# Patient Record
Sex: FEMALE | Race: WHITE | NOT HISPANIC OR LATINO | Employment: OTHER | URBAN - METROPOLITAN AREA
[De-identification: names, ages, dates, MRNs, and addresses within clinical notes are randomized per-mention and may not be internally consistent; named-entity substitution may affect disease eponyms.]

---

## 2017-03-14 ENCOUNTER — ALLIED HEALTH/NURSE VISIT (OUTPATIENT)
Dept: FAMILY MEDICINE | Facility: CLINIC | Age: 72
End: 2017-03-14
Payer: COMMERCIAL

## 2017-03-14 VITALS
SYSTOLIC BLOOD PRESSURE: 121 MMHG | HEIGHT: 64 IN | HEART RATE: 77 BPM | DIASTOLIC BLOOD PRESSURE: 71 MMHG | WEIGHT: 146 LBS | BODY MASS INDEX: 24.92 KG/M2

## 2017-03-14 DIAGNOSIS — Z71.3 ENCOUNTER FOR DIETARY COUNSELING AND SURVEILLANCE: Primary | ICD-10-CM

## 2017-03-14 PROCEDURE — 99207 ZZC NO BILLABLE SERVICE THIS VISIT: CPT | Performed by: FAMILY MEDICINE

## 2017-03-14 RX ORDER — UBIDECARENONE 50 MG
CAPSULE ORAL
COMMUNITY
End: 2021-06-24

## 2017-03-14 ASSESSMENT — ANXIETY QUESTIONNAIRES
GAD7 TOTAL SCORE: 0
4. TROUBLE RELAXING: NOT AT ALL
7. FEELING AFRAID AS IF SOMETHING AWFUL MIGHT HAPPEN: NOT AT ALL
6. BECOMING EASILY ANNOYED OR IRRITABLE: NOT AT ALL
1. FEELING NERVOUS, ANXIOUS, OR ON EDGE: NOT AT ALL
5. BEING SO RESTLESS THAT IT IS HARD TO SIT STILL: NOT AT ALL
3. WORRYING TOO MUCH ABOUT DIFFERENT THINGS: NOT AT ALL
2. NOT BEING ABLE TO STOP OR CONTROL WORRYING: NOT AT ALL

## 2017-03-14 NOTE — PROGRESS NOTES
"Well Index - Whole Health Assessment       Personal Information    Full Name:  Romana Felipe  YOB: 1945  Assessment Date: 3/14/2017  Assessment Time:  2pm  Primary Care Provider:  Dr Anais Lind  Primary Care  Clinic:  [facility] Phone Number:  [phone]  Health Coaching    During the 12-week program, the [facility]  will be making periodic check-in calls to see how you are doing. To ensure regular and consistent communication, and respect for your personal or work schedule, please answer the following questions.      What is the best phone number to reach you? Please include area code:[phone]    What are the best times of day to reach you? 4pm      Personal Goals    What are your goals for the [facility] program?  (Please check all that apply.) Lose weight  Improve my chronic conditions(s)  Other:  scoliosis, left lumbar strengthen core,    Wants to lose 20# - has gained weight in last few years from 113# to 146#    What changes are you willing to make to reach your goals? (Please check all that apply.)      Eat healthier foods  Improve/change my diet  Exercise regularly   Learn and apply techniques to improve my chronic condition(s)       Why do you want to make these changes? \" to eliminate or lessen lower back pain\"     Healthy Days Survey  Centers for Disease Control & Prevention - Used with Permission    Would you say that your general health is: Very Good    Thinking about your physical health, which includes physical illness and injury,  for how many days during the past 30 days was your physical health   not good?  0     Thinking about your mental health, which includes stress, depression and problems with emotions, for how many days during the past 30 days was your mental health not good? 0    During the past 30 days, for about how many days did poor physical or mental   health keep you from doing your usual activities " "such as self-care, work, or recreation?   0      Managing My Health  [facility]  Patient Activation Measure.  Copyright 7036-3450. [facility] - Used with Permission    Rate your knowledge, skill, and confidence in self-managing your health or chronic disease. Please select one answer for each of the following questions.     When all is said and done, I am the person who is responsible for taking care of my health:  Strongly agree    Taking an active role in my own health care is the most important thing that affects my health:   Strongly agree    I am confident I can help prevent or reduce problems associated with my health: Agree    I know what each of my prescribed medicines do: Strongly agree    I am confident that I can tell whether I need to go to the doctor or whether I can take care of a health problem myself:  Strongly agree    I am confident that I can tell a doctor the concerns I have even when he or she does not ask  Strongly agree       I am confident that I can follow through on medical treatments I may need to do at home:  Strongly agree    I understand my health problems and what causes them:  Strongly agree    I know what treatments are available for my health programs:  Agree    I have been able to maintain (keep up with) lifestyle changes, like eating right or exercising:  Strongly agree    I know how to prevent problems with my health:  Strongly agree     I am confident I can figure out solutions when new problems arise with my health:  Strongly agree    I am confident that I can maintain lifestyle changes, like eating right and exercising, even during times of stress:  Strongly agree    Stress       How would you rate the level of stress in your life?  1           Low                                                  High          What are the causes of your stress? (Please check all that apply.)   My health  My weight   Other (please specify):  \"my 's health in the last " "year\" - AAA repair     How do you cope with stress? (Please check all that apply.)   Getting emotional (e.g. irritability, anger, sadness, crying etc.)      Eating too much    Anxiety   SHANICE-7 - [employer] 2002 - Used with Permission    Over the last 2 WEEKS, how long have you been bothered by the following problems:     Feeling nervous, anxious, or on edge:   Not at all                   Not being able to stop or control worrying:  Not at all                       Worrying too much about different things:  Not at all                     Trouble relaxing:  Not at all                   Being so restless that it s hard to sit still:  Not at all                   Becoming easily annoyed or irritable:   Not at all                   Feeling afraid as if something awful might happen:   Not at all                    Patient Activation Measure (CHRISTI) 3/14/2017   Q1: When all is said and done, I am the person who is responsible for taking care of my health. 4   Q2: Taking an active role in my own health care is the most important thing that affects my health 4   Q3: I know what each of my prescribed medications do 4   Q4: I am confident that I can tell whether I need to go to the doctor or whether I can take care of a health problem myself. 4   Q5: I am confident that I can tell a doctor concerns I have even when he or she does not ask. 4   Q6: I am confident that I can follow through on medical treatments I may need to do at home  4   Q7: I have been able to maintain (keep up with) lifestyle changes, like eating right or exercising 4   Q8: I know how to prevent problems with my health 4   Q9: I am confident I can figure out solutions when new problems arise with my health. 4   Q10: I am confident that I can maintain lifestyle changes, like eating right and exercising, even during times of stress. 4         Emotional Well-Being  PHQ9 -   1999 [employer] All rights reserved - Used with Permission    Over the last 2 WEEKS, " how often have you been bothered by the following problems:     Little interest or pleasure in doing things:   Not at all                    Feeling down, depressed, or hopeless:   Not at all                    Trouble falling or staying asleep, or sleeping too much:   Not at all                   Feeling tired or having no energy:   Not at all                    Poor appetite or overeating:   Not at all                   Feeling bad about yourself or that you are a failure or have let yourself or your family down:   Not at all                    Trouble concentrating on things such as reading the newspaper or watching television:   Not at all                    Moving or speaking so slowly that people have noticed. Or, the opposite - being so fidgety or restless that you have been moving around a lot more than usual:   Not at all                   Thoughts that you would be better off dead or of hurting yourself in some way:   Not at all                   Do you have any current mental health diagnoses?  No   If yes, please specify:    Do you have a mental health therapist? No  If yes, who is your therapist?:    May we call them if we feel it would be beneficial in helping you reach your goals? No   If yes, what is their phone number?     Physical Activity    What kinds of physical activity do you enjoy? (Please check all that apply.)    Walking/hiking  Aerobics  Swimming  Biking/rollerblading  Yoga  Other:  vortex walking, deep water, jessi      Do you use a device to track your activity such as a Fitbit, Jawbone, pedometer   etc.? Yes    Do you feel comfortable lifting weights? Yes     Do you feel comfortable joining an exercise class? Yes    During an average day, how much time do you spend sitting?  Minimal      During an average week, on how many days do you get at least 30 minutes of moderate/vigorous physical activity?  3 days, for 1.25 hours      During an average day, how many non-work hours do you spend  watching screens (television, internet, video games, cell phone, etc.)? 4    What keeps you from being more active? (Please check all that apply.)   Other (please specify):  lower back pain, tired   Weight    How concerned are you about your weight?   Somewhat concerned    I have tried the following methods to lose weight: (Please check all that apply.)     Watching portion sizes or counting calories   Increasing my physical activity    If you are  or in a relationship, is your partner overweight?   Yes    Do you have children? Yes    If yes, are they overweight?  No    Nutrition & Eating Habits    Do you feel you have a good understanding of what makes up a healthy   diet?  Yes    If yes to the previous question, how often do you follow a healthy diet?  daily    How many servings of vegetables do you eat in a typical day? Do not include French fries.   A serving is one cup of salad greens or one-half cup of vegetables:  4    How many servings of fruit do you eat in a typical day?   A serving is one medium sized fruit or one-half cup of chopped, cut or canned fruit: 2    How many eight ounce glasses of water do you drink in a typical day? 5 - this includes 3 glasses of milk    How many eight ounce glasses of sugar sweetened beverages do you drink in a typical day?   (e.g. 100% juice or juice drinks, soda/pop, sports drinks, coffee drinks such as flavored lattes or frappuccinos, etc.)  0    How many cans or bottles of diet pop/soda/tea do you drink in a day? 0    My meals include foods like white bread, regular pasta, white rice, potatoes:  weekly    My meals include foods like fried meats, brats, burgers, French fries, mac and cheese, pizza, cheese, chips, or ice cream:  weekly        My meals include fast food (Mc[employer]onalds, [employer], [employer]ery, Taco Bell, etc.):  weekly     I eat at a buffet or sit-down restaurant:  weekly     I eat my meals in front of the TV or computer:  Daily - they watch the news during  dinner     I eat when I am depressed, stressed, anxious, or bored:   Never     I eat when I am happy or as a reward:  Never        I finish all the food on my plate even if I am already full:   weekly    Do you crave certain foods?  Yes  If yes, what foods do you crave? Please be specific.  sweets    In the past 12 months, how often did you worry that your food would run out before you had money to buy more?    Never     Do you worry about not having enough food to eat?   Never    Have you been to the food shelf at least a few times this past year?     No If yes, how many times?   Have you ever been diagnosed with an eating disorder? No     If yes, when were you diagnosed and what was the diagnosis?    Alcohol, Tobacco and Other Drugs    Social History   Substance Use Topics     Smoking status: Former Smoker     Smokeless tobacco: Not on file      Comment: in college      Alcohol use Yes      Comment: holidays or special occasions         Do you use any other drugs?    No  If yes, what drugs do you use? Please specify:     Sleep    How many hours do you sleep at night? 8    Do you think that you snore loudly or has anybody told you that you snore?  No  Has anyone seen or heard you stop breathing during your sleep?  No    Do you often feel tired, fatigued or sleepy during the day?    sometimes    Do you take medication to sleep?  No  If yes, what medication do you take? Please specify:      Medications & Allergies  No Known Allergies      Current Outpatient Prescriptions:      Rosuvastatin Calcium (CRESTOR PO), Take 20 mg by mouth, Disp: , Rfl:      Multiple Vitamins-Minerals (MULTIVITAMIN PO), , Disp: , Rfl:      Omega-3 Fatty Acids (FISH OIL PO), Take 1,000 mg by mouth, Disp: , Rfl:      Red Yeast Rice 600 MG TABS, , Disp: , Rfl:      UNABLE TO FIND, MEDICATION NAME: Cholest off - when eating a meal with cholesterol, Disp: , Rfl:      UNABLE TO FIND, MEDICATION NAME: Amoxicillin  Before dental, Disp: , Rfl:       aspirin 81 MG tablet, Take by mouth daily, Disp: , Rfl:      NIACIN PO, , Disp: , Rfl:      VITAMIN D, CHOLECALCIFEROL, PO, Take 1,000 Units by mouth daily, Disp: , Rfl:      Coenzyme Q10 (COQ10 PO), , Disp: , Rfl:      Are you taking any herbals, supplements, over the counter meds?  Yes If yes, please specify:  See med list   Do you take medication, prescription or over the counter, for pain?   Yes If yes, what medication do you take?  Occasional ibuprofen   Do you use any alternative forms of medicine (e.g. essential oils, homeopathy, acupuncture, naturopathy, etc.)?  No  If yes, please specify:      Do you feel safe in your home and relationships?   No    What are your main sources of social support? (Check all that apply.)  Spouse/partner  Friends  Family members    Are you connected to your community?  Yes  If yes, in what ways?  [facility], [facility]      Is there anything else you would like us to know so we can better partner with you in your Well Together journey?  No    If yes, please explain.    She is mostly doing the program to lose 20#, get stronger, and decrease back pain        Communication    It is important for you to have a way to contact the [facility] doctors and the health  in between your scheduled appointments should you have a question. We offer you the opportunity to sign up for My Chart, a secure online way for you to access your records and send a message to [facility] staff. [facility] doctors will typically respond to My Chart questions within 3-5 days. There is no cost to participate, and you can register at [email or web address] at any time.  Please let us know if this is of interest to you.    I plan to sign up for My Chart:   No     We are also exploring the possibility of creating a Well Together Facebook group page. We would use the page to share wellness and fitness tips, post great recipes, answer general questions, cheer you on, and more!  "You d be surrounded with people on a similar journey - your very own virtual community.  I am interested in joining a Well Together Facebook Group: unsure     Output and Qualitative Metrics per Massimo:     Participation in Core Program Elements (capture in spreadsheet or EPIC?)  Twelve Week Program Completion (capture in spreadsheet or EPIC?)  Participation in  Program Elements (tracked via sign in sheets)   New Patients  Program Satisfaction Survey  QoL Survey (CHRISTI)       Quantitative - Primary Metrics - Per Massimo  BMI  Weight  PHQ9  GAD7  Lipids  Waist/Hip Ratio  Body Fat %  Strength - push ups, sit ups, squats  Cardio - one mile walk test        PHYSICAL EXAM:  Vitals: /71 (BP Location: Left arm, Patient Position: Chair, Cuff Size: Adult Regular)  Pulse 77  Ht 5' 3.5\" (1.613 m)  Wt 146 lb (66.2 kg)  BMI 25.46 kg/m2  BMI= Body mass index is 25.46 kg/(m^2).           [facility] metrics:   sit ups -  # max reps         WILL BE IN NEW FLOWSHEET  push ups - # max reps  squats - # max reps  one minute walk test - distance  resting metabolic rate ( Body GEM) - in calories         ASSESSMENT AND PLAN OF CARE     Romana Felipe is a 71 year old female who comes to our [facility] collaborative for a whole health assessment.  Using motivational interviewing approaches and a collaborative model of care,she  agrees to the following shared lifestyle goals and plan of care.         Lifestyle Med Diagnoses:   Physical inactivity (getting less than recommended weekly amounts of exercise   Sedentary behaviors (getting less than 0.5-1.0 METS/day - sitting most of the day)  back pain      Mental health diagnoses:   none      Chronic conditions :   hypercholesterolemia  Arthritis  Other:  left NORMA, right TKA, bladder lift, hysterectomy, BCC on shoulder and back      Patient goals:   Lose weight  - eating a little less at each meal. Don't finish all the food on your plate. Cut down on sweets   Improve " "chronic conditions   Improve physical strength and endurance - work with  on core strength     [facility] - talk with the  about what exercises would strengthen the lower back and the abdominal wall muscles  Increase frequency of Y visits, perhaps decrease length of time or make sure time spent is varied    Screen time - how can you use it differently - resistance bands or arm weights     Join [employer] group    Meals - eat off salad plate. Read mindfulness handout     Look up a recipe for tabbolluviah - you will love it!       Core program elements:    [facility] fitness wellness consult .  Body Harrison Assessment to determine resting metabolic rate .  Twelve Week Cardio Program with [facility] personal trainers .  [facility] Dietician appointment .  Fasting Lipid panel at beginning and end of program .  Saturday morning Healthy Eating/Healthy Living mindfulness session .  Saturday morning Health Eating/Healthy Living nutrition and cooking session .  Check-ins over the phone or mychart with BHUMI Swenson regarding challenges and progress .  Check-in with MD midway through the program regarding challenges and progress .  Final check in with MD at end of 12 weeks .       program elements:    Weekly \"Walk with a Doc\" on Tuesdays  [facility] with [facility] programs including group exercise classes, cooking classes, etc  Tuesday evening \"Doc Talks\" with [facility] on a variety of medical subjects, other     In general:     Food  - consider buying Williams Hamilton s  Food Rules  and the cookbook  The Full Plate Diet   Real food, not too much. Mostly plants   Celebratory food is not everyday food  Pay attention to fullness   Eat until hunger has been relieved, not until you are full  Delicious food, delicious environment  - eat with friends and pay attention to the food   Healthy eating is a habit, not a diet   Meat is a side dish   All calories are not equal  Do not eat late at " mac Delacruz MD

## 2017-03-14 NOTE — MR AVS SNAPSHOT
"              After Visit Summary   3/14/2017    Romana Felipe    MRN: [id number]           Patient Information     Date Of Birth          1945        Visit Information        Provider Department      3/14/2017 8:23 AM Nancy Delacruz MD [facility]        Today's Diagnoses     Encounter for dietary counseling and surveillance    -  1       Follow-ups after your visit        Who to contact     Normal or non-critical lab and imaging results will be communicated to you by Booktrackhart, letter or phone within 4 business days after the clinic has received the results. If you do not hear from us within 7 days, please contact the clinic through Booktrackhart or phone. If you have a critical or abnormal lab result, we will notify you by phone as soon as possible.  Submit refill requests through Learn It Live or call your pharmacy and they will forward the refill request to us. Please allow 3 business days for your refill to be completed.          If you need to speak with a  for additional information , please call: [phone]             Additional Information About Your Visit        BooktrackharEvcarco Information     Learn It Live gives you secure access to your electronic health record. If you see a primary care provider, you can also send messages to your care team and make appointments. If you have questions, please call your primary care clinic.  If you do not have a primary care provider, please call [phone] and they will assist you.        Care EveryWhere ID     This is your Care EveryWhere ID. This could be used by other organizations to access your [place] medical records  [id number]        Your Vitals Were     Pulse Height BMI (Body Mass Index)             77 5' 3.5\" (1.613 m) 25.46 kg/m2          Blood Pressure from Last 3 Encounters:   04/25/17 137/77   03/14/17 121/71    Weight from Last 3 Encounters:   04/25/17 144 lb (65.3 kg)   03/14/17 146 lb (66.2 kg)              Today, you had the " following     No orders found for display       Primary Care Provider Office Phone # Fax #    Mely Angela Perry [phone]       [facility] [address] [address]        Thank you!     Thank you for choosing [facility]  for your care. Our goal is always to provide you with excellent care. Hearing back from our patients is one way we can continue to improve our services. Please take a few minutes to complete the written survey that you may receive in the mail after your visit with us. Thank you!             Your Updated Medication List - Protect others around you: Learn how to safely use, store and throw away your medicines at www.disposemymeds.org.          This list is accurate as of: 3/14/17 11:59 PM.  Always use your most recent med list.                   Brand Name Dispense Instructions for use    aspirin 81 MG tablet      Take by mouth daily       COQ10 PO          CRESTOR PO      Take 20 mg by mouth       FISH OIL PO      Take 1,000 mg by mouth       MULTIVITAMIN PO          NIACIN PO          Red Yeast Rice 600 MG Tabs          * UNABLE TO FIND      MEDICATION NAME: Cholest off - when eating a meal with cholesterol       * UNABLE TO FIND      MEDICATION NAME: Amoxicillin  Before dental       VITAMIN D (CHOLECALCIFEROL) PO      Take 1,000 Units by mouth daily       * Notice:  This list has 2 medication(s) that are the same as other medications prescribed for you. Read the directions carefully, and ask your doctor or other care provider to review them with you.

## 2017-03-14 NOTE — LETTER
ASSESSMENT AND PLAN OF CARE     Romana Felipe is a 71 year old female who comes to our [facility] collaborative for a whole health assessment.  Using motivational interviewing approaches and a collaborative model of care, she agrees to the following shared lifestyle goals and plan of care.         Lifestyle Med Diagnoses:  Physical inactivity (getting less than recommended weekly amounts of exercise   back pain      Mental health diagnoses:   none      Chronic conditions :   hypercholesterolemia  Arthritis  Other: left NORMA, right TKA, bladder lift, hysterectomy, BCC on shoulder and back      Patient goals:   Lose weight  - eating a little less at each meal. Don't finish all the food on your plate. Cut down on sweets   Improve chronic conditions   Improve physical strength and endurance - work with  on core strength     [facility] - talk with the  about what exercises would strengthen the lower back and the abdominal wall muscles  Increase frequency of Y visits, perhaps decrease length of time or make sure time spent is varied    Screen time - how can you use it differently - resistance bands or arm weights     Join [employer] group    Meals - eat off salad plate. Read mindfulness handout     Look up a recipe for tabbouleh - you will love it!       Core program elements:    [facility] fitness wellness consult .  Body Jessie Assessment to determine resting metabolic rate .  Twelve Week Cardio Program with [facility] personal trainers .  [facility] appointment .  Fasting Lipid panel at beginning and end of program .  Saturday morning Healthy Eating/Healthy Living mindfulness session .  Saturday morning Health Eating/Healthy Living nutrition and cooking session .  Check-ins over the phone or mychart with BHUMI Swenson regarding challenges and progress .  Check-in with MD midway through the program regarding challenges and progress .  Final check in  "with MD at end of 12 weeks .       program elements:    Weekly \"Walk with a Doc\" on Tuesdays  Health-UP with Madiha  [facility] programs including group exercise classes, cooking classes, etc  Tuesday evening \"Doc Talks\" with Madiha on a variety of medical subjects, other     In general:     Food  - consider buying [employer] s  Food Rules  and the cookbook  The Full Plate Diet   Real food, not too much. Mostly plants   Celebratory food is not everyday food  Pay attention to fullness   Eat until hunger has been relieved, not until you are full  Delicious food, delicious environment  - eat with friends and pay attention to the food   Healthy eating is a habit, not a diet   Meat is a side dish   All calories are not equal  Do not eat late at night      TASHI Delacruz MD   "

## 2017-03-15 ASSESSMENT — ANXIETY QUESTIONNAIRES: GAD7 TOTAL SCORE: 0

## 2017-03-28 ENCOUNTER — TELEPHONE (OUTPATIENT)
Dept: FAMILY MEDICINE | Facility: CLINIC | Age: 72
End: 2017-03-28

## 2017-03-28 NOTE — TELEPHONE ENCOUNTER
"    [facility] \"Well Together\" Collaboration  -Health  follow-up phone call    Date of phone call : 3/28/17    Name of caller:  Leela GONZALEZ BHUMI    \"Hi, This is Leela and I'm working with the Well Together team.  I was calling today to check in with you and see how things are going.  Let's start by reviewing what portions of the program you have completed.\"    What they have done: dietary changes , using bike, swimming- mon,wed,fri, Walking in vortex water.  Janice class   Exercising  2 1/2 5 days a week     What they have left to do:      \"How do you think things are going for you in the Well Together program?\"  Yes    Wants to get back stronger     \"What successes have you had?\"  Trying new classes     \"What struggles have you had?\"  Chlorine is bothering her   Pool is too small   Struggling with carbs - per  for both of them     \"May I ask you a quick question about your activity level?    If yes, - In the past two weeks, how many times have you exercised?\"  10     \"Have you made any dietary/lifestyle changes that you feel comfortable sharing with me (be specific)?\"  Eating less meat    Under on proteins and fats for meals    Crackers at lunch less bread   Avocados on crackers     \"Any questions we can help with?\"   Diet questions- will ask dietician- meeting with next week      Remind them of upcoming dates - walk with doc, Doc Talks on Tuesdays, 6 week check in, Saturday meetings      \"If the doctor wants to respond to our phone conversation, are [employer] messages ok?\"  Yes     \"Do you want to be on our [employer] group? Or, if you are already on it, is it useful?\"      \"I will route this message to Dr Phelps or Dr Bruce. Thanks for taking a few minutes to chat with me today.\"    "

## 2017-04-20 ENCOUNTER — TRANSFERRED RECORDS (OUTPATIENT)
Dept: HEALTH INFORMATION MANAGEMENT | Facility: CLINIC | Age: 72
End: 2017-04-20

## 2017-04-20 ENCOUNTER — RECORDS - HEALTHEAST (OUTPATIENT)
Dept: LAB | Facility: CLINIC | Age: 72
End: 2017-04-20

## 2017-04-20 LAB
CHOLEST SERPL-MCNC: 220 MG/DL
FASTING STATUS PATIENT QL REPORTED: YES
HDLC SERPL-MCNC: 58 MG/DL
LDLC SERPL CALC-MCNC: 129 MG/DL
TRIGL SERPL-MCNC: 166 MG/DL

## 2017-04-25 ENCOUNTER — ALLIED HEALTH/NURSE VISIT (OUTPATIENT)
Dept: FAMILY MEDICINE | Facility: CLINIC | Age: 72
End: 2017-04-25
Payer: COMMERCIAL

## 2017-04-25 VITALS
DIASTOLIC BLOOD PRESSURE: 77 MMHG | WEIGHT: 144 LBS | SYSTOLIC BLOOD PRESSURE: 137 MMHG | HEART RATE: 70 BPM | HEIGHT: 64 IN | BODY MASS INDEX: 24.59 KG/M2

## 2017-04-25 DIAGNOSIS — Z09 FOLLOW-UP EXAMINATION: Primary | ICD-10-CM

## 2017-04-25 PROCEDURE — 99207 ZZC NO BILLABLE SERVICE THIS VISIT: CPT | Performed by: FAMILY MEDICINE

## 2017-04-25 NOTE — PROGRESS NOTES
"[facility] \"Well Together\" Collaboration  6 week Physician follow-up appointment     Date: 4/25/17  Phone or in-person: in person   Date of initial assessment : 3/14/17  with MD: TASHI Delacruz MD      Wt Readings from Last 3 Encounters:   04/25/17 144 lb (65.3 kg)   03/14/17 146 lb (66.2 kg)       Body mass index is 25.11 kg/(m^2).      BP Readings from Last 3 Encounters:   04/25/17 159/79   03/14/17 121/71       How do you think things are going for you in the Well Together program?  \"pretty average\"     Have you met with RD? Yes today   Have you met with the ?   Have you completed the Body [place]? yes  Have you done Walk with a Doc? no  Have you attended a Saturday meeting, or both Saturday meetings? yes    Re; Well Together - What lifestyle changes have you made?     Working out harder than she has before, feels she is getting stronger.     What changes do you still want to make?    Feels like she is doing pretty well with current diet and exercise changes     What are some of your successes?  -has changed the heaviness of the weights     \"What are some of your struggles?  Back pain - somewhat improved. Scoliosis     Re; your activity level?  - In the past two weeks, how many times have you exercised?\"  10/14    What was your answer before \"Well Together\"?   6/14    What specific dietary/lifestyle changes have you made?  Less white bread and whole grains     Any questions?  Wondering if she needs to adjust her workouts and how she knows when to ease up     Remind them of upcoming dates - Walk with Doc, Health Up on Tuesday evenings, upcoming Saturday meeting, 12 week check in.      Do you want to be on our Well Together Facebook group?  no    Are you interested in an ongoing meetings or gatherings?  unsure  What does that look like to you?   unsure    Ongoing wellness plan?   Continue changes she has made        Feedback -  Would like more connection with the      TASHI Delacruz MD   "

## 2017-04-25 NOTE — MR AVS SNAPSHOT
"              After Visit Summary   4/25/2017    Romana Felipe    MRN: [id number]           Patient Information     Date Of Birth          1945        Visit Information        Provider Department      4/25/2017 2:16 PM Nancy Delacruz MD [facility]        Today's Diagnoses     Follow-up examination    -  1       Follow-ups after your visit        Who to contact     Normal or non-critical lab and imaging results will be communicated to you by [facility] [facility], letter or phone within 4 business days after the clinic has received the results. If you do not hear from us within 7 days, please contact the clinic through [facility] [facility] or phone. If you have a critical or abnormal lab result, we will notify you by phone as soon as possible.  Submit refill requests through [employer] or call your pharmacy and they will forward the refill request to us. Please allow 3 business days for your refill to be completed.          If you need to speak with a  for additional information , please call: [phone]             Additional Information About Your Visit        [facility] Information     [facility] gives you secure access to your electronic health record. If you see a primary care provider, you can also send messages to your care team and make appointments. If you have questions, please call your primary care clinic.  If you do not have a primary care provider, please call [phone] and they will assist you.        Care EveryWhere ID     This is your Care EveryWhere ID. This could be used by other organizations to access your [place] medical records  [id number]        Your Vitals Were     Pulse Height BMI (Body Mass Index)             70 5' 3.5\" (1.613 m) 25.11 kg/m2          Blood Pressure from Last 3 Encounters:   04/25/17 137/77   03/14/17 121/71    Weight from Last 3 Encounters:   04/25/17 144 lb (65.3 kg)   03/14/17 146 lb (66.2 kg)              Today, you had the following     No orders found " for display       Primary Care Provider Office Phone # Fax #    Mely Angela Perry [phone]       [facility] [address] [facility] [address] [address]        Thank you!     Thank you for choosing [facility]  for your care. Our goal is always to provide you with excellent care. Hearing back from our patients is one way we can continue to improve our services. Please take a few minutes to complete the written survey that you may receive in the mail after your visit with us. Thank you!             Your Updated Medication List - Protect others around you: Learn how to safely use, store and throw away your medicines at www.disposemymeds.org.          This list is accurate as of: 4/25/17 11:59 PM.  Always use your most recent med list.                   Brand Name Dispense Instructions for use    aspirin 81 MG tablet      Take by mouth daily       COQ10 PO          CRESTOR PO      Take 20 mg by mouth       FISH OIL PO      Take 1,000 mg by mouth       MULTIVITAMIN PO          NIACIN PO          Red Yeast Rice 600 MG Tabs          * UNABLE TO FIND      MEDICATION NAME: Cholest off - when eating a meal with cholesterol       * UNABLE TO FIND      MEDICATION NAME: Amoxicillin  Before dental       VITAMIN D (CHOLECALCIFEROL) PO      Take 1,000 Units by mouth daily       * Notice:  This list has 2 medication(s) that are the same as other medications prescribed for you. Read the directions carefully, and ask your doctor or other care provider to review them with you.

## 2017-06-20 ENCOUNTER — ALLIED HEALTH/NURSE VISIT (OUTPATIENT)
Dept: FAMILY MEDICINE | Facility: CLINIC | Age: 72
End: 2017-06-20
Payer: COMMERCIAL

## 2017-06-20 VITALS
SYSTOLIC BLOOD PRESSURE: 126 MMHG | DIASTOLIC BLOOD PRESSURE: 71 MMHG | BODY MASS INDEX: 23.73 KG/M2 | HEIGHT: 64 IN | HEART RATE: 75 BPM | WEIGHT: 139 LBS

## 2017-06-20 DIAGNOSIS — Z71.3 ENCOUNTER FOR DIETARY COUNSELING AND SURVEILLANCE: Primary | ICD-10-CM

## 2017-06-20 PROCEDURE — 99207 ZZC NO BILLABLE SERVICE THIS VISIT: CPT | Performed by: FAMILY MEDICINE

## 2017-06-20 ASSESSMENT — ANXIETY QUESTIONNAIRES
1. FEELING NERVOUS, ANXIOUS, OR ON EDGE: SEVERAL DAYS
5. BEING SO RESTLESS THAT IT IS HARD TO SIT STILL: NOT AT ALL
7. FEELING AFRAID AS IF SOMETHING AWFUL MIGHT HAPPEN: NOT AT ALL
2. NOT BEING ABLE TO STOP OR CONTROL WORRYING: NOT AT ALL
GAD7 TOTAL SCORE: 1
4. TROUBLE RELAXING: NOT AT ALL
3. WORRYING TOO MUCH ABOUT DIFFERENT THINGS: NOT AT ALL
6. BECOMING EASILY ANNOYED OR IRRITABLE: NOT AT ALL

## 2017-06-20 NOTE — MR AVS SNAPSHOT
"              After Visit Summary   6/20/2017    Romana Felipe    MRN: [id number]           Patient Information     Date Of Birth          1945        Visit Information        Provider Department      6/20/2017 3:19 PM Nancy Delacruz MD [facility]        Today's Diagnoses     Encounter for dietary counseling and surveillance    -  1       Follow-ups after your visit        Who to contact     Normal or non-critical lab and imaging results will be communicated to you by [facility] [employer], letter or phone within 4 business days after the clinic has received the results. If you do not hear from us within 7 days, please contact the clinic through [facility] [employer] or phone. If you have a critical or abnormal lab result, we will notify you by phone as soon as possible.  Submit refill requests through [employer] or call your pharmacy and they will forward the refill request to us. Please allow 3 business days for your refill to be completed.          If you need to speak with a  for additional information , please call: [phone]             Additional Information About Your Visit        [employer] Information     [facility] [facility] [employer] gives you secure access to your electronic health record. If you see a primary care provider, you can also send messages to your care team and make appointments. If you have questions, please call your primary care clinic.  If you do not have a primary care provider, please call [phone] and they will assist you.        Care EveryWhere ID     This is your Care EveryWhere ID. This could be used by other organizations to access your [place] medical records  [id number]        Your Vitals Were     Pulse Height BMI (Body Mass Index)             75 5' 3.5\" (1.613 m) 24.24 kg/m2          Blood Pressure from Last 3 Encounters:   06/20/17 126/71   04/25/17 137/77   03/14/17 121/71    Weight from Last 3 Encounters:   06/20/17 139 lb (63 kg)   04/25/17 144 lb (65.3 kg)   03/14/17 146 lb " (66.2 kg)              Today, you had the following     No orders found for display       Primary Care Provider Office Phone # Fax #    Mely Perry [phone]       [facility] [address] [address]        Equal Access to Services     ELIANA MAXWELL : Hadii aad ku hadasho Soomaali, waaxda luqadaha, qaybta kaalmada adeegyada, waxay panfiloin hayaan dashawn starrkalpeshsouleymane varner. So [facility] [phone].    ATENCIÓN: Si habla español, tiene a corona disposición servicios gratuitos de asistencia lingüística. Llame al [phone].    We comply with applicable federal civil rights laws and [place] laws. We do not discriminate on the basis of race, color, national origin, age, disability sex, sexual orientation or gender identity.            Thank you!     Thank you for choosing [facility]  for your care. Our goal is always to provide you with excellent care. Hearing back from our patients is one way we can continue to improve our services. Please take a few minutes to complete the written survey that you may receive in the mail after your visit with us. Thank you!             Your Updated Medication List - Protect others around you: Learn how to safely use, store and throw away your medicines at www.disposemymeds.org.          This list is accurate as of: 6/20/17 11:59 PM.  Always use your most recent med list.                   Brand Name Dispense Instructions for use Diagnosis    aspirin 81 MG tablet      Take by mouth daily        COQ10 PO           CRESTOR PO      Take 20 mg by mouth        FISH OIL PO      Take 1,000 mg by mouth        MULTIVITAMIN PO           NIACIN PO           Red Yeast Rice 600 MG Tabs           * UNABLE TO FIND      MEDICATION NAME: Cholest off - when eating a meal with cholesterol        * UNABLE TO FIND      MEDICATION NAME: Amoxicillin  Before dental        VITAMIN D (CHOLECALCIFEROL) PO      Take 1,000 Units by mouth daily        * Notice:   This list has 2 medication(s) that are the same as other medications prescribed for you. Read the directions carefully, and ask your doctor or other care provider to review them with you.

## 2017-06-20 NOTE — PROGRESS NOTES
[facility] - Whole Health Assessment       Personal Information    Full Name:  Romana Felipe   YOB: 1945  Assessment Date:  06/20/2017 - 12 week final assessment   Initial assessment : 3/14/17      Personal Goals    What are your goals for the [facility] program?  (Please check all that apply.) Lose weight  Improve my diet  Get stronger and/or improve flexibility  Be more physically active  Learn more about healthy living  Improve my quality of life    What goals were you most successful at achieving?  (Please check all that apply.)   Lose weight  Improve my diet  Get stronger and/or improve flexibility  Be more physically active  Learn more about healthy living  Improve my quality of life     She has really enjoyed the program. She feels stronger and more capable. She would have liked more contact with the trainers.     They have changed their diet and are eating less white break and potatoes, less milk, less carbs overall, and they have increased their fruits and vegies     Healthy Days Survey  Centers for Disease Control & Prevention - Used with Permission    Would you say that your general health is: Very Good    Thinking about your physical health, which includes physical illness and injury,  for how many days during the past 30 days was your physical health   not good?  0     Thinking about your mental health, which includes stress, depression and problems with emotions, for how many days during the past 30 days was your mental health not good? 0    During the past 30 days, for about how many days did poor physical or mental   health keep you from doing your usual activities such as self-care, work, or recreation?   0      Managing My Health  [facility]  Patient Activation Measure.  Copyright 1736-4792. [facility] - Used with Permission    Rate your knowledge, skill, and confidence in self-managing your health or chronic disease. Please select one answer for each of  the following questions.     When all is said and done, I am the person who is responsible for taking care of my health:  Strongly disagree    Taking an active role in my own health care is the most important thing that affects my health:   Strongly disagree    I know what each of my prescribed medicines do: Strongly agree    I am confident that I can tell whether I need to go to the doctor or whether I can take care of a health problem myself:  Strongly agree    I am confident that I can tell a doctor the concerns I have even when he or she does not ask  Strongly agree       I am confident that I can follow through on medical treatments I may need to do at home:  Strongly agree    I have been able to maintain (keep up with) lifestyle changes, like eating right or exercising:  Strongly agree    I know how to prevent problems with my health:  Strongly agree     I am confident I can figure out solutions when new problems arise with my health:  Strongly agree    I am confident that I can maintain lifestyle changes, like eating right and exercising, even during times of stress:  Agree    Stress       How would you rate the level of stress in your life? 3- 4           Low                                                  High            Anxiety   SHANICE-7 - [employer] 2002 - Used with Permission    Over the last 2 WEEKS, how long have you been bothered by the following problems:     Feeling nervous, anxious, or on edge:   Several days    Not being able to stop or control worrying:  Not at all                       Worrying too much about different things:  Not at all                     Trouble relaxing:  Not at all                   Being so restless that it s hard to sit still:  Not at all                   Becoming easily annoyed or irritable:   Not at all                   Feeling afraid as if something awful might happen:   Not at all                    Emotional Well-Being  PHQ9 -   1999 [employer] All rights  reserved - Used with Permission    Over the last 2 WEEKS, how often have you been bothered by the following problems:     Little interest or pleasure in doing things:   Not at all                    Feeling down, depressed, or hopeless:   Not at all                    Trouble falling or staying asleep, or sleeping too much:   Not at all                   Feeling tired or having no energy:   Not at all                    Poor appetite or overeating:   Not at all                   Feeling bad about yourself or that you are a failure or have let yourself or your family down:   Not at all                    Trouble concentrating on things such as reading the newspaper or watching television:   Not at all                    Moving or speaking so slowly that people have noticed. Or, the opposite - being so fidgety or restless that you have been moving around a lot more than usual:   Not at all                   Thoughts that you would be better off dead or of hurting yourself in some way:   Not at all                       Physical Activity      During an average day, how much time do you spend sitting?  Minimal      During an average week, on how many days do you get at least 30 minutes of moderate/vigorous physical activity?  5     During an average day, how many non-work hours do you spend watching screens (television, internet, video games, cell phone, etc.)? 2      Nutrition & Eating Habits    Do you feel you have a good understanding of what makes up a healthy   diet? Yes    If yes to the previous question, how often do you follow a healthy diet? Daily    How many servings of vegetables do you eat in a typical day? Do not include French fries.   A serving is one cup of salad greens or one-half cup of vegetables: 3    How many servings of fruit do you eat in a typical day?   A serving is one medium sized fruit or one-half cup of chopped, cut or canned fruit: 3                    Alcohol, Tobacco and Other  Drugs    Social History   Substance Use Topics     Smoking status: Never Smoker     Smokeless tobacco: Never Used     Alcohol use Yes      Comment: 1-2 per month          Do you drink alcohol?  Yes  If yes, how many drinks do you have on an average day?    Average week?    Do you use tobacco products?  No  If yes, what tobacco products do you use? Please specify:    How much do you use on an average day?   Are you interested in quitting?     Do you use any other drugs?    No  If yes, what drugs do you use? Please specify:     Sleep    How many hours do you sleep at night? 8 or more     Do you take medication to sleep?  No  If yes, what medication do you take? Please specify:      Body mass index is 24.24 kg/(m^2).    Wt Readings from Last 4 Encounters:   06/20/17 139 lb (63 kg)   04/25/17 144 lb (65.3 kg)   03/14/17 146 lb (66.2 kg)         She has lost 7#    diagnosed with cancer and has been to many specialist appointments but they are trying to stick to the program and eating better  She has added much more physical activity to her life. She is feeling stronger     TASHI Delacruz MD

## 2017-06-21 ASSESSMENT — ANXIETY QUESTIONNAIRES: GAD7 TOTAL SCORE: 1

## 2017-07-03 ENCOUNTER — HOSPITAL ENCOUNTER (EMERGENCY)
Facility: CLINIC | Age: 72
End: 2017-07-03
Payer: COMMERCIAL

## 2017-10-26 ENCOUNTER — TRANSFERRED RECORDS (OUTPATIENT)
Dept: HEALTH INFORMATION MANAGEMENT | Facility: CLINIC | Age: 72
End: 2017-10-26

## 2017-12-07 ENCOUNTER — OFFICE (OUTPATIENT)
Dept: URBAN - METROPOLITAN AREA CLINIC 9 | Facility: CLINIC | Age: 72
End: 2017-12-07
Payer: MEDICAID

## 2017-12-07 VITALS
HEIGHT: 63 IN | DIASTOLIC BLOOD PRESSURE: 62 MMHG | HEART RATE: 54 BPM | SYSTOLIC BLOOD PRESSURE: 144 MMHG | WEIGHT: 188 LBS

## 2017-12-07 DIAGNOSIS — R19.5 OTHER FECAL ABNORMALITIES: ICD-10-CM

## 2017-12-07 DIAGNOSIS — I10 ESSENTIAL (PRIMARY) HYPERTENSION: ICD-10-CM

## 2017-12-07 DIAGNOSIS — E11.9 TYPE 2 DIABETES MELLITUS WITHOUT COMPLICATIONS: ICD-10-CM

## 2017-12-07 DIAGNOSIS — K52.89 OTHER SPECIFIED NONINFECTIVE GASTROENTERITIS AND COLITIS: ICD-10-CM

## 2017-12-07 DIAGNOSIS — R12 HEARTBURN: ICD-10-CM

## 2017-12-07 DIAGNOSIS — E66.9 OBESITY, UNSPECIFIED: ICD-10-CM

## 2017-12-07 LAB
IMMUNOGLOBULIN A, QN, SERUM: 116 MG/DL (ref 64–422)
T-TRANSGLUTAMINASE (TTG) IGA: <2 U/ML

## 2017-12-07 PROCEDURE — G8427 DOCREV CUR MEDS BY ELIG CLIN: HCPCS | Performed by: SPECIALIST

## 2017-12-07 PROCEDURE — 99202 OFFICE O/P NEW SF 15 MIN: CPT | Performed by: SPECIALIST

## 2017-12-07 NOTE — SERVICEHPINOTES
Very nice lady with chronic diarrhea.  Has been on metformin for a long time and is unsure it onset of diarrhea correlates with this.  Positive hemoccult.  Long-standing heartburn well-treated with daily PPI.

## 2018-03-26 ENCOUNTER — AMBULATORY SURGICAL CENTER (OUTPATIENT)
Dept: URBAN - METROPOLITAN AREA SURGERY 2 | Facility: SURGERY | Age: 73
End: 2018-03-26

## 2018-03-26 ENCOUNTER — OFFICE (OUTPATIENT)
Dept: URBAN - METROPOLITAN AREA PATHOLOGY 22 | Facility: PATHOLOGY | Age: 73
End: 2018-03-26
Payer: MEDICAID

## 2018-03-26 ENCOUNTER — AMBULATORY SURGICAL CENTER (OUTPATIENT)
Dept: URBAN - METROPOLITAN AREA SURGERY 2 | Facility: SURGERY | Age: 73
End: 2018-03-26
Payer: MEDICAID

## 2018-03-26 VITALS
HEART RATE: 63 BPM | DIASTOLIC BLOOD PRESSURE: 65 MMHG | HEART RATE: 63 BPM | HEART RATE: 59 BPM | OXYGEN SATURATION: 98 % | TEMPERATURE: 98.6 F | SYSTOLIC BLOOD PRESSURE: 133 MMHG | HEIGHT: 63 IN | SYSTOLIC BLOOD PRESSURE: 133 MMHG | SYSTOLIC BLOOD PRESSURE: 176 MMHG | WEIGHT: 185 LBS | DIASTOLIC BLOOD PRESSURE: 51 MMHG | HEART RATE: 67 BPM | DIASTOLIC BLOOD PRESSURE: 57 MMHG | TEMPERATURE: 98.2 F | HEART RATE: 61 BPM | OXYGEN SATURATION: 99 % | OXYGEN SATURATION: 98 % | OXYGEN SATURATION: 100 % | SYSTOLIC BLOOD PRESSURE: 132 MMHG | SYSTOLIC BLOOD PRESSURE: 132 MMHG | OXYGEN SATURATION: 100 % | RESPIRATION RATE: 16 BRPM | OXYGEN SATURATION: 99 % | DIASTOLIC BLOOD PRESSURE: 52 MMHG | HEART RATE: 59 BPM | HEART RATE: 67 BPM | WEIGHT: 185 LBS | HEIGHT: 63 IN | RESPIRATION RATE: 18 BRPM | DIASTOLIC BLOOD PRESSURE: 65 MMHG | SYSTOLIC BLOOD PRESSURE: 176 MMHG | RESPIRATION RATE: 18 BRPM | DIASTOLIC BLOOD PRESSURE: 52 MMHG | DIASTOLIC BLOOD PRESSURE: 51 MMHG | TEMPERATURE: 98.6 F | HEART RATE: 61 BPM | DIASTOLIC BLOOD PRESSURE: 57 MMHG | RESPIRATION RATE: 16 BRPM | TEMPERATURE: 98.2 F | SYSTOLIC BLOOD PRESSURE: 135 MMHG | SYSTOLIC BLOOD PRESSURE: 135 MMHG

## 2018-03-26 DIAGNOSIS — K64.4 RESIDUAL HEMORRHOIDAL SKIN TAGS: ICD-10-CM

## 2018-03-26 DIAGNOSIS — K64.8 OTHER HEMORRHOIDS: ICD-10-CM

## 2018-03-26 DIAGNOSIS — K31.7 POLYP OF STOMACH AND DUODENUM: ICD-10-CM

## 2018-03-26 DIAGNOSIS — R12 HEARTBURN: ICD-10-CM

## 2018-03-26 DIAGNOSIS — D12.2 BENIGN NEOPLASM OF ASCENDING COLON: ICD-10-CM

## 2018-03-26 DIAGNOSIS — K57.30 DIVERTICULOSIS OF LARGE INTESTINE WITHOUT PERFORATION OR ABS: ICD-10-CM

## 2018-03-26 DIAGNOSIS — R19.7 DIARRHEA, UNSPECIFIED: ICD-10-CM

## 2018-03-26 DIAGNOSIS — I10 ESSENTIAL (PRIMARY) HYPERTENSION: ICD-10-CM

## 2018-03-26 DIAGNOSIS — D50.9 IRON DEFICIENCY ANEMIA, UNSPECIFIED: ICD-10-CM

## 2018-03-26 DIAGNOSIS — E11.9 TYPE 2 DIABETES MELLITUS WITHOUT COMPLICATIONS: ICD-10-CM

## 2018-03-26 PROCEDURE — 43239 EGD BIOPSY SINGLE/MULTIPLE: CPT | Mod: 51 | Performed by: SPECIALIST

## 2018-03-26 PROCEDURE — 88305 TISSUE EXAM BY PATHOLOGIST: CPT | Performed by: SPECIALIST

## 2018-03-26 PROCEDURE — 88342 IMHCHEM/IMCYTCHM 1ST ANTB: CPT | Performed by: SPECIALIST

## 2018-03-26 PROCEDURE — G8918 PT W/O PREOP ORDER IV AB PRO: HCPCS | Performed by: SPECIALIST

## 2018-03-26 PROCEDURE — G8907 PT DOC NO EVENTS ON DISCHARG: HCPCS | Performed by: SPECIALIST

## 2018-03-26 PROCEDURE — 45380 COLONOSCOPY AND BIOPSY: CPT | Performed by: SPECIALIST

## 2018-03-26 PROCEDURE — 88341 IMHCHEM/IMCYTCHM EA ADD ANTB: CPT | Performed by: SPECIALIST

## 2018-03-26 PROCEDURE — 88313 SPECIAL STAINS GROUP 2: CPT | Performed by: SPECIALIST

## 2018-04-24 ENCOUNTER — RECORDS - HEALTHEAST (OUTPATIENT)
Dept: LAB | Facility: CLINIC | Age: 73
End: 2018-04-24

## 2018-04-24 LAB
ALBUMIN SERPL-MCNC: 4 G/DL (ref 3.5–5)
ALP SERPL-CCNC: 86 U/L (ref 45–120)
ALT SERPL W P-5'-P-CCNC: 30 U/L (ref 0–45)
ANION GAP SERPL CALCULATED.3IONS-SCNC: 14 MMOL/L (ref 5–18)
AST SERPL W P-5'-P-CCNC: 19 U/L (ref 0–40)
BILIRUB SERPL-MCNC: 0.5 MG/DL (ref 0–1)
BUN SERPL-MCNC: 13 MG/DL (ref 8–28)
CALCIUM SERPL-MCNC: 9.6 MG/DL (ref 8.5–10.5)
CHLORIDE BLD-SCNC: 105 MMOL/L (ref 98–107)
CHOLEST SERPL-MCNC: 220 MG/DL
CO2 SERPL-SCNC: 22 MMOL/L (ref 22–31)
CREAT SERPL-MCNC: 0.72 MG/DL (ref 0.6–1.1)
FASTING STATUS PATIENT QL REPORTED: ABNORMAL
GFR SERPL CREATININE-BSD FRML MDRD: >60 ML/MIN/1.73M2
GLUCOSE BLD-MCNC: 83 MG/DL (ref 70–125)
HDLC SERPL-MCNC: 65 MG/DL
LDLC SERPL CALC-MCNC: 126 MG/DL
POTASSIUM BLD-SCNC: 4.6 MMOL/L (ref 3.5–5)
PROT SERPL-MCNC: 7 G/DL (ref 6–8)
SODIUM SERPL-SCNC: 141 MMOL/L (ref 136–145)
TRIGL SERPL-MCNC: 146 MG/DL

## 2018-04-26 ENCOUNTER — RECORDS - HEALTHEAST (OUTPATIENT)
Dept: ADMINISTRATIVE | Facility: OTHER | Age: 73
End: 2018-04-26

## 2018-04-26 ENCOUNTER — AMBULATORY - HEALTHEAST (OUTPATIENT)
Dept: SCHEDULING | Facility: CLINIC | Age: 73
End: 2018-04-26

## 2018-04-26 DIAGNOSIS — Z13.820 OSTEOPOROSIS SCREENING: ICD-10-CM

## 2019-04-29 ENCOUNTER — TRANSFERRED RECORDS (OUTPATIENT)
Dept: HEALTH INFORMATION MANAGEMENT | Facility: CLINIC | Age: 74
End: 2019-04-29

## 2019-04-29 ENCOUNTER — RECORDS - HEALTHEAST (OUTPATIENT)
Dept: LAB | Facility: CLINIC | Age: 74
End: 2019-04-29

## 2019-04-29 LAB
ALBUMIN SERPL-MCNC: 4.4 G/DL (ref 3.5–5)
ALP SERPL-CCNC: 84 U/L (ref 45–120)
ALT SERPL W P-5'-P-CCNC: 25 U/L (ref 0–45)
ANION GAP SERPL CALCULATED.3IONS-SCNC: 13 MMOL/L (ref 5–18)
AST SERPL W P-5'-P-CCNC: 19 U/L (ref 0–40)
BILIRUB SERPL-MCNC: 0.7 MG/DL (ref 0–1)
BUN SERPL-MCNC: 15 MG/DL (ref 8–28)
CALCIUM SERPL-MCNC: 10.1 MG/DL (ref 8.5–10.5)
CHLORIDE BLD-SCNC: 105 MMOL/L (ref 98–107)
CHOLEST SERPL-MCNC: 227 MG/DL
CO2 SERPL-SCNC: 23 MMOL/L (ref 22–31)
CREAT SERPL-MCNC: 0.74 MG/DL (ref 0.6–1.1)
FASTING STATUS PATIENT QL REPORTED: ABNORMAL
GFR SERPL CREATININE-BSD FRML MDRD: >60 ML/MIN/1.73M2
GLUCOSE BLD-MCNC: 90 MG/DL (ref 70–125)
HDLC SERPL-MCNC: 63 MG/DL
LDLC SERPL CALC-MCNC: 140 MG/DL
POTASSIUM BLD-SCNC: 4.7 MMOL/L (ref 3.5–5)
PROT SERPL-MCNC: 7 G/DL (ref 6–8)
SODIUM SERPL-SCNC: 141 MMOL/L (ref 136–145)
TRIGL SERPL-MCNC: 121 MG/DL

## 2019-05-09 ENCOUNTER — TRANSFERRED RECORDS (OUTPATIENT)
Dept: HEALTH INFORMATION MANAGEMENT | Facility: CLINIC | Age: 74
End: 2019-05-09

## 2019-11-02 ENCOUNTER — HEALTH MAINTENANCE LETTER (OUTPATIENT)
Age: 74
End: 2019-11-02

## 2020-02-10 ENCOUNTER — HEALTH MAINTENANCE LETTER (OUTPATIENT)
Age: 75
End: 2020-02-10

## 2020-04-22 ENCOUNTER — APPOINTMENT (OUTPATIENT)
Dept: GENERAL RADIOLOGY | Facility: CLINIC | Age: 75
End: 2020-04-22
Attending: FAMILY MEDICINE
Payer: COMMERCIAL

## 2020-04-22 ENCOUNTER — HOSPITAL ENCOUNTER (EMERGENCY)
Facility: CLINIC | Age: 75
Discharge: HOME | End: 2020-04-22
Attending: FAMILY MEDICINE | Admitting: FAMILY MEDICINE
Payer: COMMERCIAL

## 2020-04-22 VITALS
RESPIRATION RATE: 20 BRPM | HEART RATE: 92 BPM | DIASTOLIC BLOOD PRESSURE: 94 MMHG | TEMPERATURE: 97.9 F | OXYGEN SATURATION: 95 % | SYSTOLIC BLOOD PRESSURE: 156 MMHG | BODY MASS INDEX: 24.41 KG/M2 | WEIGHT: 140 LBS

## 2020-04-22 DIAGNOSIS — S67.197A CRUSHING INJURY OF LEFT LITTLE FINGER, INITIAL ENCOUNTER: ICD-10-CM

## 2020-04-22 PROCEDURE — 99284 EMERGENCY DEPT VISIT MOD MDM: CPT | Mod: 25 | Performed by: FAMILY MEDICINE

## 2020-04-22 PROCEDURE — 12001 RPR S/N/AX/GEN/TRNK 2.5CM/<: CPT | Mod: Z6 | Performed by: FAMILY MEDICINE

## 2020-04-22 PROCEDURE — 12001 RPR S/N/AX/GEN/TRNK 2.5CM/<: CPT

## 2020-04-22 PROCEDURE — 99284 EMERGENCY DEPT VISIT MOD MDM: CPT | Mod: 25

## 2020-04-22 PROCEDURE — 73140 X-RAY EXAM OF FINGER(S): CPT | Mod: LT

## 2020-04-22 NOTE — ED NOTES
Pt had mole trap snap pts left 5th finger.  Laceration to tip of finger.  Bleeding controlled.  Pt denies pain.

## 2020-04-22 NOTE — DISCHARGE INSTRUCTIONS
ICD-10-CM    1. Crushing injury of left little finger, initial encounter  S67.197A     laceration was repaired with 2 sutures that should be removed in 10 days. return for signs infection. keep clean. remove bandage in 24 hours. keep splinted.

## 2020-04-22 NOTE — ED PROVIDER NOTES
History     Chief Complaint   Patient presents with     Hand Injury     HPI  Romana Felipe is a 75 year old female who presents with a laceration to the distal phalanx of the left fifth finger after she was trying to adjust a mole trap and it snapped and her finger caught in the spring.  Denies other associated injuries.   Tetanus is up-to-date.  She had initial bleeding from the site but no subsequent bleeding.          Allergies:  No Known Allergies    Problem List:    There are no active problems to display for this patient.       Past Medical History:    No past medical history on file.    Past Surgical History:    No past surgical history on file.    Family History:    No family history on file.    Social History:  Marital Status:   [2]  Social History     Tobacco Use     Smoking status: Former Smoker     Tobacco comment: in college    Substance Use Topics     Alcohol use: Yes     Comment: holidays or special occasions      Drug use: Not on file        Medications:    aspirin 81 MG tablet  Coenzyme Q10 (COQ10 PO)  Multiple Vitamins-Minerals (MULTIVITAMIN PO)  NIACIN PO  Omega-3 Fatty Acids (FISH OIL PO)  Red Yeast Rice 600 MG TABS  Rosuvastatin Calcium (CRESTOR PO)  UNABLE TO FIND  UNABLE TO FIND  VITAMIN D, CHOLECALCIFEROL, PO          Review of Systems   ROS:  5 point ROS negative except as noted above in HPI, including Gen., Resp., CV, GI &  system review.      Physical Exam   BP: (!) 177/86  Pulse: 92  Temp: 97.9  F (36.6  C)  Resp: 20  Weight: 63.5 kg (140 lb)  SpO2: 96 %      Physical Exam  Musculoskeletal:        Hands:           Distal left fifth finger with crush injury.  No obvious bleeding.  The lateral aspect of the nail has been avulsed and there may be a laceration through the base at this area along with the adjacent skin.  Normal distal DIP PIP and MCP joint function.  No obvious weakness or sensory deficit.      ED Course        [facility]    -Laceration Repair    Date/Time: 4/22/2020 6:57 PM  Performed by: Feroz Hutson MD  Authorized by: eFroz Hutson MD       ANESTHESIA (see MAR for exact dosages):     Anesthesia method:  Nerve block    Block anesthetic:  Bupivacaine 0.25% w/o epi    Block technique:  Digital  LACERATION DETAILS     Location:  Finger    Finger location:  L small finger    Length (cm):  1    Depth (mm):  3    REPAIR TYPE:     Repair type:  Simple      EXPLORATION:     Contaminated: no      TREATMENT:     Area cleansed with:  Saline    Amount of cleaning:  Extensive    Irrigation solution:  Sterile saline    Irrigation volume:  500    Irrigation method:  Syringe    Visualized foreign bodies/material removed: no      SKIN REPAIR     Repair method:  Sutures    Suture size:  4-0    Suture material:  Nylon    APPROXIMATION     Approximation:  Close    POST-PROCEDURE DETAILS     Dressing:  Tube gauze, splint for protection, antibiotic ointment and non-adherent dressing      PROCEDURE   Patient Tolerance:  Patient tolerated the procedure well with no immediate complications                     Critical Care time:  none               Results for orders placed or performed during the hospital encounter of 04/22/20 (from the past 24 hour(s))   Fingers XR, 2-3 views, left    Narrative    EXAM: XR FINGER LT G/E 2 VW  LOCATION: [facility]  DATE/TIME: 4/22/2020 5:43 PM    INDICATION: Pain  COMPARISON: None.      Impression    IMPRESSION: Significant degenerative change at the PIP joint of the left small finger. No evidence for acute fracture. Additional degenerative change at the DIP joint. Soft tissue swelling centered on the PIP joint.         Medications - No data to display    Assessments & Plan (with Medical Decision Making)     MDM: Romana Felipe is a 75 year old female who presented with a laceration to her little finger left hand after crush injury that occurred at home when she was placing mole traps.  Her  finger was caught in the spring.  No fracture on x-ray.  Normal distal motor function and sensation.  The crush injury resulted in a laceration at the edge of the nail fold on the lateral aspect which had resulted in also a linear laceration -perpendicular to the nail edge.  This was reapproximated with a single 4-0 Ethilon.  Another Ethilon suture was placed to approximate the laceration edges to the edge of the nail.  A single 4-0 Ethilon was used through the nail.  Bacitracin, Adaptic, tube gauze and splint were applied.  Precautions are given for return.  Tetanus was up-to-date.  I have reviewed the nursing notes.    I have reviewed the findings, diagnosis, plan and need for follow up with the patient.      New Prescriptions    No medications on file       Final diagnoses:   Crushing injury of left little finger, initial encounter - laceration was repaired with 2 sutures that should be removed in 10 days. return for signs infection. keep clean. remove bandage in 24 hours. keep splinted.       4/22/2020   [facility] EMERGENCY DEPARTMENT     Feroz Hutson MD  04/22/20 8056

## 2020-04-22 NOTE — ED AVS SNAPSHOT
[facility] Emergency Department  [address] [address]  Phone:  [phone]  Fax:  [phone]                                    Romana Felipe   MRN: [id number]    Department:  [facility] Emergency Department   Date of Visit:  4/22/2020           After Visit Summary Signature Page    I have received my discharge instructions, and my questions have been answered. I have discussed any challenges I see with this plan with the nurse or doctor.    ..........................................................................................................................................  Patient/Patient Representative Signature      ..........................................................................................................................................  Patient Representative Print Name and Relationship to Patient    ..................................................               ................................................  Date                                   Time    ..........................................................................................................................................  Reviewed by Signature/Title    ...................................................              ..............................................  Date                                               Time          22EPIC Rev 08/18

## 2020-05-01 ENCOUNTER — OFFICE VISIT (OUTPATIENT)
Dept: FAMILY MEDICINE | Facility: CLINIC | Age: 75
End: 2020-05-01
Payer: COMMERCIAL

## 2020-05-01 VITALS
SYSTOLIC BLOOD PRESSURE: 128 MMHG | HEART RATE: 85 BPM | WEIGHT: 148.44 LBS | HEIGHT: 63 IN | OXYGEN SATURATION: 96 % | BODY MASS INDEX: 26.3 KG/M2 | DIASTOLIC BLOOD PRESSURE: 80 MMHG | TEMPERATURE: 97.5 F

## 2020-05-01 DIAGNOSIS — Z48.02 ENCOUNTER FOR REMOVAL OF SUTURES: Primary | ICD-10-CM

## 2020-05-01 PROCEDURE — 99207 ZZC NO BILLABLE SERVICE THIS VISIT: CPT | Performed by: INTERNAL MEDICINE

## 2020-05-01 ASSESSMENT — MIFFLIN-ST. JEOR: SCORE: 1137.44

## 2020-05-01 NOTE — PROGRESS NOTES
"Subjective     Romana Felipe is a 75 year old female who presents to clinic today for the following health issues:    HPI   ED/UC Followup:    Facility:  [facility]  Date of visit: 4/22/20   Reason for visit: Crushing injury of left little finger   Current Status: She is doing well, needs sutures removed today.        There is no problem list on file for this patient.    History reviewed. No pertinent surgical history.    Social History     Tobacco Use     Smoking status: Former Smoker     Smokeless tobacco: Former User     Tobacco comment: in college    Substance Use Topics     Alcohol use: Yes     Comment: holidays or special occasions      History reviewed. No pertinent family history.      Current Outpatient Medications   Medication Sig Dispense Refill     aspirin 81 MG tablet Take by mouth daily       Coenzyme Q10 (COQ10 PO)        Multiple Vitamins-Minerals (MULTIVITAMIN PO)        NIACIN PO        Omega-3 Fatty Acids (FISH OIL PO) Take 1,000 mg by mouth       Red Yeast Rice 600 MG TABS        Rosuvastatin Calcium (CRESTOR PO) Take 20 mg by mouth       UNABLE TO FIND MEDICATION NAME: Cholest off - when eating a meal with cholesterol       UNABLE TO FIND MEDICATION NAME: Amoxicillin  Before dental       VITAMIN D, CHOLECALCIFEROL, PO Take 1,000 Units by mouth daily       No Known Allergies    Reviewed and updated as needed this visit by Provider         Review of Systems   ROS COMP: Constitutional, derm systems are negative, except as otherwise noted.      Objective    /80   Pulse 85   Temp 97.5  F (36.4  C) (Tympanic)   Ht 1.6 m (5' 3\")   Wt 67.3 kg (148 lb 7 oz)   SpO2 96%   Breastfeeding No   BMI 26.29 kg/m    Body mass index is 26.29 kg/m .  Physical Exam   GENERAL: healthy, alert and no distress  SKIN: left distal 5th finger: one suture in place in skin adjacent to nail and one in place going through nail and skin, some scabbing, no drainage or erythema          Assessment & Plan "     1. Encounter for removal of sutures    Two sutures were removed from L 5th finger with some mild resultant bleeding.  She did have some discomfort with the procedure and topical lidocaine was applied.  Finger bandaged after sutures removed and home care discussed.     - Suture Removal No Charge      Patient Instructions   Gently clean the finger once per day with soap and water.  Apply the antibiotic ointment and bandage daily.  You'll probably have to do this for about a week.      Call if increased pain, pus, or redness develop.       Priyank Butler MD  [facility]

## 2020-05-01 NOTE — PATIENT INSTRUCTIONS
Gently clean the finger once per day with soap and water.  Apply the antibiotic ointment and bandage daily.  You'll probably have to do this for about a week.      Call if increased pain, pus, or redness develop.

## 2020-07-20 ENCOUNTER — TRANSFERRED RECORDS (OUTPATIENT)
Dept: HEALTH INFORMATION MANAGEMENT | Facility: CLINIC | Age: 75
End: 2020-07-20

## 2020-07-20 ENCOUNTER — RECORDS - HEALTHEAST (OUTPATIENT)
Dept: LAB | Facility: CLINIC | Age: 75
End: 2020-07-20

## 2020-07-20 LAB
ALBUMIN SERPL-MCNC: 4.3 G/DL (ref 3.5–5)
ALP SERPL-CCNC: 90 U/L (ref 45–120)
ALT SERPL W P-5'-P-CCNC: 21 U/L (ref 0–45)
ANION GAP SERPL CALCULATED.3IONS-SCNC: 10 MMOL/L (ref 5–18)
AST SERPL W P-5'-P-CCNC: 16 U/L (ref 0–40)
BILIRUB SERPL-MCNC: 0.6 MG/DL (ref 0–1)
BUN SERPL-MCNC: 17 MG/DL (ref 8–28)
CALCIUM SERPL-MCNC: 9.5 MG/DL (ref 8.5–10.5)
CHLORIDE BLD-SCNC: 103 MMOL/L (ref 98–107)
CHOLEST SERPL-MCNC: 270 MG/DL
CO2 SERPL-SCNC: 26 MMOL/L (ref 22–31)
CREAT SERPL-MCNC: 0.75 MG/DL (ref 0.6–1.1)
FASTING STATUS PATIENT QL REPORTED: ABNORMAL
GFR SERPL CREATININE-BSD FRML MDRD: >60 ML/MIN/1.73M2
GLUCOSE BLD-MCNC: 96 MG/DL (ref 70–125)
HDLC SERPL-MCNC: 51 MG/DL
LDLC SERPL CALC-MCNC: 177 MG/DL
POTASSIUM BLD-SCNC: 4.9 MMOL/L (ref 3.5–5)
PROT SERPL-MCNC: 7.1 G/DL (ref 6–8)
SODIUM SERPL-SCNC: 139 MMOL/L (ref 136–145)
TRIGL SERPL-MCNC: 212 MG/DL

## 2020-11-16 ENCOUNTER — HEALTH MAINTENANCE LETTER (OUTPATIENT)
Age: 75
End: 2020-11-16

## 2021-02-02 ENCOUNTER — IMMUNIZATION (OUTPATIENT)
Dept: NURSING | Facility: CLINIC | Age: 76
End: 2021-02-02
Payer: COMMERCIAL

## 2021-02-02 PROCEDURE — 0001A PR COVID VAC PFIZER DIL RECON 30 MCG/0.3 ML IM: CPT

## 2021-02-02 PROCEDURE — 91300 PR COVID VAC PFIZER DIL RECON 30 MCG/0.3 ML IM: CPT

## 2021-02-23 ENCOUNTER — IMMUNIZATION (OUTPATIENT)
Dept: NURSING | Facility: CLINIC | Age: 76
End: 2021-02-23
Attending: FAMILY MEDICINE
Payer: COMMERCIAL

## 2021-02-23 PROCEDURE — 0002A PR COVID VAC PFIZER DIL RECON 30 MCG/0.3 ML IM: CPT

## 2021-02-23 PROCEDURE — 91300 PR COVID VAC PFIZER DIL RECON 30 MCG/0.3 ML IM: CPT

## 2021-04-03 ENCOUNTER — HEALTH MAINTENANCE LETTER (OUTPATIENT)
Age: 76
End: 2021-04-03

## 2021-05-28 ENCOUNTER — RECORDS - HEALTHEAST (OUTPATIENT)
Dept: ADMINISTRATIVE | Facility: CLINIC | Age: 76
End: 2021-05-28

## 2021-05-29 ENCOUNTER — RECORDS - HEALTHEAST (OUTPATIENT)
Dept: ADMINISTRATIVE | Facility: CLINIC | Age: 76
End: 2021-05-29

## 2021-06-02 ENCOUNTER — RECORDS - HEALTHEAST (OUTPATIENT)
Dept: ADMINISTRATIVE | Facility: CLINIC | Age: 76
End: 2021-06-02

## 2021-06-12 ENCOUNTER — TELEPHONE (OUTPATIENT)
Dept: DERMATOLOGY | Facility: CLINIC | Age: 76
End: 2021-06-12

## 2021-06-12 NOTE — TELEPHONE ENCOUNTER
Reason for call:  Other   Patient called regarding (reason for call): call back    Additional comments: per patient , she will like to be seen asap for spots on her back . She will like a call back from a nurse or provider.     Phone number to reach patient:  Home number on file [phone] (home)    Best Time:  Anytime     Can we leave a detailed message on this number?  NO    Travel screening: Not Applicable

## 2021-06-14 NOTE — TELEPHONE ENCOUNTER
Unable to reach patient. No Dermatology appointments available until early July as we tend to book 2 to 10 weeks in advance year round.     I did leave phone numbers for [facility] [phone] and [facility] [phone] to see if they could see her sooner, but we do currently have approximately 50 people on our wait list for a sooner than available Dermatology appointment.     I did apologize as well.     Wnedy Grvoer RN

## 2021-06-23 PROBLEM — G43.909 MIGRAINE: Status: ACTIVE | Noted: 2021-06-23

## 2021-06-23 PROBLEM — Z85.828 HISTORY OF BASAL CELL CARCINOMA: Status: ACTIVE | Noted: 2021-06-23

## 2021-06-23 PROBLEM — M41.9 SCOLIOSIS: Status: ACTIVE | Noted: 2021-06-23

## 2021-06-24 DIAGNOSIS — M85.89 OSTEOPENIA OF MULTIPLE SITES: ICD-10-CM

## 2021-06-24 RX ORDER — AMOXICILLIN 500 MG/1
2000 CAPSULE ORAL ONCE
Qty: 4 CAPSULE | Refills: 0 | COMMUNITY
Start: 2021-06-24 | End: 2022-08-02

## 2021-06-24 RX ORDER — DIMENHYDRINATE 50 MG
1 TABLET ORAL DAILY
COMMUNITY
Start: 2021-06-24 | End: 2021-07-28

## 2021-06-24 RX ORDER — MULTIVITAMIN
1 CAPSULE ORAL DAILY
COMMUNITY
Start: 2021-06-24 | End: 2022-08-02

## 2021-06-24 RX ORDER — NIACIN 500 MG/1
1000 TABLET, EXTENDED RELEASE ORAL AT BEDTIME
COMMUNITY
Start: 2021-06-24 | End: 2021-07-28

## 2021-06-24 RX ORDER — ROSUVASTATIN CALCIUM 20 MG/1
20 TABLET, COATED ORAL
COMMUNITY
Start: 2021-06-24 | End: 2021-07-28

## 2021-06-24 RX ORDER — CALCIUM CARBONATE/VITAMIN D3 600 MG-10
2 TABLET ORAL
COMMUNITY
Start: 2021-06-24 | End: 2021-07-28

## 2021-06-24 RX ORDER — OMEGA-3-ACID ETHYL ESTERS 1 G/1
2 CAPSULE, LIQUID FILLED ORAL DAILY
COMMUNITY
Start: 2021-06-24 | End: 2021-07-28

## 2021-06-24 RX ORDER — ANTIOX #8/OM3/DHA/EPA/LUT/ZEAX 250-2.5 MG
2 CAPSULE ORAL DAILY
COMMUNITY
Start: 2021-06-24

## 2021-06-24 RX ORDER — IBUPROFEN 600 MG/1
600 TABLET, FILM COATED ORAL EVERY 6 HOURS PRN
COMMUNITY
Start: 2021-06-24

## 2021-07-12 ENCOUNTER — OFFICE VISIT (OUTPATIENT)
Dept: DERMATOLOGY | Facility: CLINIC | Age: 76
End: 2021-07-12
Payer: COMMERCIAL

## 2021-07-12 VITALS — SYSTOLIC BLOOD PRESSURE: 132 MMHG | OXYGEN SATURATION: 96 % | HEART RATE: 80 BPM | DIASTOLIC BLOOD PRESSURE: 71 MMHG

## 2021-07-12 DIAGNOSIS — D18.01 ANGIOMA OF SKIN: ICD-10-CM

## 2021-07-12 DIAGNOSIS — L82.1 SEBORRHEIC KERATOSIS: Primary | ICD-10-CM

## 2021-07-12 DIAGNOSIS — C44.519 BASAL CELL CARCINOMA (BCC) OF BACK: ICD-10-CM

## 2021-07-12 DIAGNOSIS — Z85.828 HISTORY OF SKIN CANCER: ICD-10-CM

## 2021-07-12 DIAGNOSIS — D23.9 DERMAL NEVUS: ICD-10-CM

## 2021-07-12 DIAGNOSIS — L81.4 LENTIGO: ICD-10-CM

## 2021-07-12 DIAGNOSIS — C44.612 BASAL CELL CARCINOMA (BCC) OF RIGHT SHOULDER: ICD-10-CM

## 2021-07-12 PROCEDURE — 11103 TANGNTL BX SKIN EA SEP/ADDL: CPT | Performed by: DERMATOLOGY

## 2021-07-12 PROCEDURE — 99203 OFFICE O/P NEW LOW 30 MIN: CPT | Mod: 25 | Performed by: DERMATOLOGY

## 2021-07-12 PROCEDURE — 11102 TANGNTL BX SKIN SINGLE LES: CPT | Performed by: DERMATOLOGY

## 2021-07-12 NOTE — PROGRESS NOTES
Romana Felipe is an extremely pleasant 76 year old year old female patient here today for spots on back and face.   .   Patient states this has been present for a while.  Patient reports the following symptoms:  Bleeding on back.  .  Patient reports the following previous treatments none.  These treatments did not work.  Patient reports the following modifying factors none.  Associated symptoms: none.  Patient has no other skin complaints today.  Remainder of the HPI, Meds, PMH, Allergies, FH, and SH was reviewed in chart.    Pertinent Hx:   Non-melanoma skin cancer   Past Medical History:   Diagnosis Date     Basal cell carcinoma        Past Surgical History:   Procedure Laterality Date     APPENDECTOMY       BLADDER SUSPENSION       HYSTERECTOMY       HYSTERECTOMY       OOPHORECTOMY       TOTAL HIP ARTHROPLASTY Left      TOTAL KNEE ARTHROPLASTY Right         Family History   Problem Relation Age of Onset     Dementia Mother      Myocardial Infarction Father      No Known Problems Sister      No Known Problems Daughter      No Known Problems Maternal Grandmother      No Known Problems Maternal Grandfather      No Known Problems Paternal Grandmother      No Known Problems Paternal Grandfather      No Known Problems Maternal Aunt      No Known Problems Paternal Aunt      Hereditary Breast and Ovarian Cancer Syndrome No family hx of      Breast Cancer No family hx of      Cancer No family hx of      Colon Cancer No family hx of      Endometrial Cancer No family hx of      Ovarian Cancer No family hx of        Social History     Socioeconomic History     Marital status:      Spouse name: Not on file     Number of children: Not on file     Years of education: Not on file     Highest education level: Not on file   Occupational History     Not on file   Tobacco Use     Smoking status: Former Smoker     Smokeless tobacco: Former User     Tobacco comment: in college    Substance and Sexual Activity     Alcohol  use: Yes     Comment: holidays or special occasions      Drug use: Not on file     Sexual activity: Not on file   Other Topics Concern     Not on file   Social History Narrative     Not on file     Social Determinants of Health     Financial Resource Strain:      Difficulty of Paying Living Expenses:    Food Insecurity:      Worried About Running Out of Food in the Last Year:      Ran Out of Food in the Last Year:    Transportation Needs:      Lack of Transportation (Medical):      Lack of Transportation (Non-Medical):    Physical Activity:      Days of Exercise per Week:      Minutes of Exercise per Session:    Stress:      Feeling of Stress :    Social Connections:      Frequency of Communication with Friends and Family:      Frequency of Social Gatherings with Friends and Family:      Attends Mandaen Services:      Active Member of Clubs or Organizations:      Attends Club or Organization Meetings:      Marital Status:    Intimate Partner Violence:      Fear of Current or Ex-Partner:      Emotionally Abused:      Physically Abused:      Sexually Abused:        Outpatient Encounter Medications as of 7/12/2021   Medication Sig Dispense Refill     amoxicillin (AMOXIL) 500 MG capsule Take 4 capsules (2,000 mg) by mouth once for 1 dose Prior to dental work 4 capsule 0     aspirin 81 MG tablet Take by mouth daily       aspirin-acetaminophen-caffeine (EXCEDRIN MIGRAINE) 250-250-65 MG tablet Take 1 tablet by mouth every 6 hours as needed for headaches       coenzyme Q10 (CO-Q10) 30 MG capsule Take 3 capsules (90 mg) by mouth daily 100 capsule      Flaxseed, Linseed, (FLAX SEED OIL) 1000 MG capsule Take 1 capsule (1,000 mg) by mouth daily       ibuprofen (ADVIL/MOTRIN) 600 MG tablet Take 1 tablet (600 mg) by mouth every 6 hours as needed for moderate pain       Multiple Vitamins-Minerals (PRESERVISION AREDS 2) CAPS Take 1 capsule by mouth daily       multivitamin (DEKAS ESSENTIAL) capsule Take 1 capsule by mouth daily        niacin ER (NIASPAN) 500 MG CR tablet Take 2 tablets (1,000 mg) by mouth At Bedtime       omega-3 acid ethyl esters (LOVAZA) 1 g capsule Take 2 capsules (2 g) by mouth daily       Plant Sterols and Stanols (CHOLESTOFF) 450 MG TABS Take 2 tablets by mouth 3 times daily (with meals)       rosuvastatin (CRESTOR) 20 MG tablet Take 1 tablet (20 mg) by mouth three times a week       VITAMIN D, CHOLECALCIFEROL, PO Take 1,000 Units by mouth daily       No facility-administered encounter medications on file as of 7/12/2021.             O:   NAD, WDWN, Alert & Oriented, Mood & Affect wnl, Vitals stable   Here today alone   /71   Pulse 80   SpO2 96%    General appearance normal   Vitals stable   Alert, oriented and in no acute distress      Following lymph nodes palpated: Occipital, Cervical, Supraclavicular no lad   L mid back 1.1cm pink pearly papule    R ant shoulder 1.5cm pink pearly papule  Brown macules on face      Stuck on papules and brown macules on trunk and ext   Red papules on trunk  Flesh colored papules on trunk     The remainder of the full exam was normal; the following areas were examined:  conjunctiva/lids, oral mucosa, neck, peripheral vascular system, abdomen, lymph nodes, digits/nails, eccrine and apocrine glands, scalp/hair, face, neck, chest, abdomen, buttocks, back, RUE, LUE, RLE, LLE       Eyes: Conjunctivae/lids:Normal     ENT: Lips, buccal mucosa, tongue: normal    MSK:Normal    Cardiovascular: peripheral edema none    Pulm: Breathing Normal    Lymph Nodes: No Head and Neck Lymphadenopathy     Neuro/Psych: Orientation:Alert and Orientedx3 ; Mood/Affect:normal       MICRO:   R ant shoulder(red):Orthokeratosis of epidermis with a proliferation of nests of basaloid cells, with peripheral palisading and a haphazard arrangement in the center extending into the dermis, forming nodules.  The tumor cells have hyperchromatic nuclei. Poor cytoplasm and intercellular bridging.    L mid  back(blue):Orthokeratosis of epidermis with a proliferation of nests of basaloid cells, with peripheral palisading and a haphazard arrangement in the center extending into the dermis, forming nodules.  The tumor cells have hyperchromatic nuclei. Poor cytoplasm and intercellular bridging.    A/P:  1. Seborrheic keratosis, lentigo, angioma, dermal nevus, hx of non-melanoma skin cancer   2. R/o basal cell carcinoma   TANGENTIAL BIOPSY IN HOUSE:  After consent, anesthesia with LEC and prep, tangential excision performed and dx above confirmed with frozen section histology.  No complications and routine wound care.  Patient is not on  anticoagulants and risk of bleeding discussed with patient.       I have personally reviewed all specimens and/or slides and used them with my medical judgement to determine or confirm the final diagnosis.     Patient told result   R ant shoulder basal cell carcinoma   L mid back basal cell carcinoma   Schedule excision     It was a pleasure speaking to Romana Felipe today.  Previous clinic notes and pertinent laboratory tests were reviewed prior to Romana Felipe's visit.  Nature and genetics of benign skin lesions dicussed with patient.  Signs and Symptoms of skin cancer discussed with patient.  Patient encouraged to perform monthly skin exams.  UV precautions reviewed with patient.  Risks of non-melanoma skin cancer discussed with patient   Return to clinic next appt

## 2021-07-12 NOTE — PATIENT INSTRUCTIONS
Wound Care Instructions     FOR SUPERFICIAL WOUNDS     [facility] [phone]    [facility] [phone]    Back & right shoulder                   AFTER 24 HOURS YOU SHOULD REMOVE THE BANDAGE AND BEGIN DAILY DRESSING CHANGES AS FOLLOWS:     1) Remove Dressing.     2) Clean and dry the area with tap water using a Q-tip or sterile gauze pad.     3) Apply Vaseline, Aquaphor, Polysporin ointment or Bacitracin ointment over entire wound.  Do NOT use Neosporin ointment.     4) Cover the wound with a band-aid, or a sterile non-stick gauze pad and micropore paper tape      REPEAT THESE INSTRUCTIONS AT LEAST ONCE A DAY UNTIL THE WOUND HAS COMPLETELY HEALED.    It is an old wives tale that a wound heals better when it is exposed to air and allowed to dry out. The wound will heal faster with a better cosmetic result if it is kept moist with ointment and covered with a bandage.    **Do not let the wound dry out.**      Supplies Needed:      *Cotton tipped applicators (Q-tips)    *Polysporin Ointment or Bacitracin Ointment (NOT NEOSPORIN)    *Band-aids or non-stick gauze pads and micropore paper tape.      PATIENT INFORMATION:    During the healing process you will notice a number of changes. All wounds develop a small halo of redness surrounding the wound.  This means healing is occurring. Severe itching with extensive redness usually indicates sensitivity to the ointment or bandage tape used to dress the wound.  You should call our office if this develops.      Swelling  and/or discoloration around your surgical site is common, particularly when performed around the eye.    All wounds normally drain.  The larger the wound the more drainage there will be.  After 7-10 days, you will notice the wound beginning to shrink and new skin will begin to grow.  The wound is healed when you can see skin has formed over the entire area.  A healed wound has a healthy, shiny look to the surface and is red  to dark pink in color to normalize.  Wounds may take approximately 4-6 weeks to heal.  Larger wounds may take 6-8 weeks.  After the wound is healed you may discontinue dressing changes.    You may experience a sensation of tightness as your wound heals. This is normal and will gradually subside.    Your healed wound may be sensitive to temperature changes. This sensitivity improves with time, but if you re having a lot of discomfort, try to avoid temperature extremes.    Patients frequently experience itching after their wound appears to have healed because of the continue healing under the skin.  Plain Vaseline will help relieve the itching.        POSSIBLE COMPLICATIONS    BLEEDIN. Leave the bandage in place.  2. Use tightly rolled up gauze or a cloth to apply direct pressure over the bandage for 30  minutes.  3. Reapply pressure for an additional 30 minutes if necessary  4. Use additional gauze and tape to maintain pressure once the bleeding has stopped.

## 2021-07-12 NOTE — NURSING NOTE
Chief Complaint   Patient presents with     Skin Check       Vitals:    07/12/21 1315   BP: 132/71   Pulse: 80   SpO2: 96%     Wt Readings from Last 1 Encounters:   05/01/20 67.3 kg (148 lb 7 oz)       Magaly Mclean LPN.................7/12/2021

## 2021-07-12 NOTE — LETTER
7/12/2021         RE: Romana Felipe  [address] [facility] [address]        Dear Colleague,    Thank you for referring your patient, Romana Felipe, to the [facility]. Please see a copy of my visit note below.    Romana Felipe is an extremely pleasant 76 year old year old female patient here today for spots on back and face.   .   Patient states this has been present for a while.  Patient reports the following symptoms:  Bleeding on back.  .  Patient reports the following previous treatments none.  These treatments did not work.  Patient reports the following modifying factors none.  Associated symptoms: none.  Patient has no other skin complaints today.  Remainder of the HPI, Meds, PMH, Allergies, FH, and SH was reviewed in chart.    Pertinent Hx:   Non-melanoma skin cancer   Past Medical History:   Diagnosis Date     Basal cell carcinoma        Past Surgical History:   Procedure Laterality Date     APPENDECTOMY       BLADDER SUSPENSION       HYSTERECTOMY       HYSTERECTOMY       OOPHORECTOMY       TOTAL HIP ARTHROPLASTY Left      TOTAL KNEE ARTHROPLASTY Right         Family History   Problem Relation Age of Onset     Dementia Mother      Myocardial Infarction Father      No Known Problems Sister      No Known Problems Daughter      No Known Problems Maternal Grandmother      No Known Problems Maternal Grandfather      No Known Problems Paternal Grandmother      No Known Problems Paternal Grandfather      No Known Problems Maternal Aunt      No Known Problems Paternal Aunt      Hereditary Breast and Ovarian Cancer Syndrome No family hx of      Breast Cancer No family hx of      Cancer No family hx of      Colon Cancer No family hx of      Endometrial Cancer No family hx of      Ovarian Cancer No family hx of        Social History     Socioeconomic History     Marital status:      Spouse name: Not on file     Number of children: Not on file     Years of  education: Not on file     Highest education level: Not on file   Occupational History     Not on file   Tobacco Use     Smoking status: Former Smoker     Smokeless tobacco: Former User     Tobacco comment: in college    Substance and Sexual Activity     Alcohol use: Yes     Comment: holidays or special occasions      Drug use: Not on file     Sexual activity: Not on file   Other Topics Concern     Not on file   Social History Narrative     Not on file     Social Determinants of Health     Financial Resource Strain:      Difficulty of Paying Living Expenses:    Food Insecurity:      Worried About Running Out of Food in the Last Year:      Ran Out of Food in the Last Year:    Transportation Needs:      Lack of Transportation (Medical):      Lack of Transportation (Non-Medical):    Physical Activity:      Days of Exercise per Week:      Minutes of Exercise per Session:    Stress:      Feeling of Stress :    Social Connections:      Frequency of Communication with Friends and Family:      Frequency of Social Gatherings with Friends and Family:      Attends [employer] Services:      Active Member of Clubs or Organizations:      Attends Club or Organization Meetings:      Marital Status:    Intimate Partner Violence:      Fear of Current or Ex-Partner:      Emotionally Abused:      Physically Abused:      Sexually Abused:        Outpatient Encounter Medications as of 7/12/2021   Medication Sig Dispense Refill     amoxicillin (AMOXIL) 500 MG capsule Take 4 capsules (2,000 mg) by mouth once for 1 dose Prior to dental work 4 capsule 0     aspirin 81 MG tablet Take by mouth daily       aspirin-acetaminophen-caffeine (EXCEDRIN MIGRAINE) 250-250-65 MG tablet Take 1 tablet by mouth every 6 hours as needed for headaches       coenzyme Q10 (CO-Q10) 30 MG capsule Take 3 capsules (90 mg) by mouth daily 100 capsule      Flaxseed, Linseed, (FLAX SEED OIL) 1000 MG capsule Take 1 capsule (1,000 mg) by mouth daily       ibuprofen  (ADVIL/MOTRIN) 600 MG tablet Take 1 tablet (600 mg) by mouth every 6 hours as needed for moderate pain       Multiple Vitamins-Minerals (PRESERVISION AREDS 2) CAPS Take 1 capsule by mouth daily       multivitamin (DEKAS ESSENTIAL) capsule Take 1 capsule by mouth daily       niacin ER (NIASPAN) 500 MG CR tablet Take 2 tablets (1,000 mg) by mouth At Bedtime       omega-3 acid ethyl esters (LOVAZA) 1 g capsule Take 2 capsules (2 g) by mouth daily       Plant Sterols and Stanols (CHOLESTOFF) 450 MG TABS Take 2 tablets by mouth 3 times daily (with meals)       rosuvastatin (CRESTOR) 20 MG tablet Take 1 tablet (20 mg) by mouth three times a week       VITAMIN D, CHOLECALCIFEROL, PO Take 1,000 Units by mouth daily       No facility-administered encounter medications on file as of 7/12/2021.             O:   NAD, WDWN, Alert & Oriented, Mood & Affect wnl, Vitals stable   Here today alone   /71   Pulse 80   SpO2 96%    General appearance normal   Vitals stable   Alert, oriented and in no acute distress      Following lymph nodes palpated: Occipital, Cervical, Supraclavicular no lad   L mid back 1.1cm pink pearly papule    R ant shoulder 1.5cm pink pearly papule  Brown macules on face      Stuck on papules and brown macules on trunk and ext   Red papules on trunk  Flesh colored papules on trunk     The remainder of the full exam was normal; the following areas were examined:  conjunctiva/lids, oral mucosa, neck, peripheral vascular system, abdomen, lymph nodes, digits/nails, eccrine and apocrine glands, scalp/hair, face, neck, chest, abdomen, buttocks, back, RUE, LUE, RLE, LLE       Eyes: Conjunctivae/lids:Normal     ENT: Lips, buccal mucosa, tongue: normal    MSK:Normal    Cardiovascular: peripheral edema none    Pulm: Breathing Normal    Lymph Nodes: No Head and Neck Lymphadenopathy     Neuro/Psych: Orientation:Alert and Orientedx3 ; Mood/Affect:normal       MICRO:   R ant shoulder(red):Orthokeratosis of epidermis  with a proliferation of nests of basaloid cells, with peripheral palisading and a haphazard arrangement in the center extending into the dermis, forming nodules.  The tumor cells have hyperchromatic nuclei. Poor cytoplasm and intercellular bridging.    L mid back(blue):Orthokeratosis of epidermis with a proliferation of nests of basaloid cells, with peripheral palisading and a haphazard arrangement in the center extending into the dermis, forming nodules.  The tumor cells have hyperchromatic nuclei. Poor cytoplasm and intercellular bridging.    A/P:  1. Seborrheic keratosis, lentigo, angioma, dermal nevus, hx of non-melanoma skin cancer   2. R/o basal cell carcinoma   TANGENTIAL BIOPSY IN HOUSE:  After consent, anesthesia with LEC and prep, tangential excision performed and dx above confirmed with frozen section histology.  No complications and routine wound care.  Patient is not on  anticoagulants and risk of bleeding discussed with patient.       I have personally reviewed all specimens and/or slides and used them with my medical judgement to determine or confirm the final diagnosis.     Patient told result   R ant shoulder basal cell carcinoma   L mid back basal cell carcinoma   Schedule excision     It was a pleasure speaking to Romana Felipe today.  Previous clinic notes and pertinent laboratory tests were reviewed prior to Romana Felipe's visit.  Nature and genetics of benign skin lesions dicussed with patient.  Signs and Symptoms of skin cancer discussed with patient.  Patient encouraged to perform monthly skin exams.  UV precautions reviewed with patient.  Risks of non-melanoma skin cancer discussed with patient   Return to clinic next appt        Again, thank you for allowing me to participate in the care of your patient.        Sincerely,        Williams Westbrook MD

## 2021-07-13 ENCOUNTER — RECORDS - HEALTHEAST (OUTPATIENT)
Dept: ADMINISTRATIVE | Facility: CLINIC | Age: 76
End: 2021-07-13

## 2021-07-13 ENCOUNTER — OFFICE VISIT (OUTPATIENT)
Dept: DERMATOLOGY | Facility: CLINIC | Age: 76
End: 2021-07-13
Payer: COMMERCIAL

## 2021-07-13 VITALS — OXYGEN SATURATION: 96 % | DIASTOLIC BLOOD PRESSURE: 95 MMHG | SYSTOLIC BLOOD PRESSURE: 155 MMHG | HEART RATE: 85 BPM

## 2021-07-13 DIAGNOSIS — C44.519 BASAL CELL CARCINOMA (BCC) OF BACK: ICD-10-CM

## 2021-07-13 DIAGNOSIS — C44.612 BASAL CELL CARCINOMA (BCC) OF RIGHT SHOULDER: Primary | ICD-10-CM

## 2021-07-13 PROCEDURE — 11602 EXC TR-EXT MAL+MARG 1.1-2 CM: CPT | Mod: 59 | Performed by: DERMATOLOGY

## 2021-07-13 PROCEDURE — 11602 EXC TR-EXT MAL+MARG 1.1-2 CM: CPT | Performed by: DERMATOLOGY

## 2021-07-13 PROCEDURE — 88331 PATH CONSLTJ SURG 1 BLK 1SPC: CPT | Performed by: DERMATOLOGY

## 2021-07-13 NOTE — NURSING NOTE
"Initial BP (!) 155/95   Pulse 85   SpO2 96%  Estimated body mass index is 26.29 kg/m  as calculated from the following:    Height as of 5/1/20: 1.6 m (5' 3\").    Weight as of 5/1/20: 67.3 kg (148 lb 7 oz). .      "

## 2021-07-13 NOTE — PROGRESS NOTES
Romana Felipe is an extremely pleasant 76 year old year old female patient here today for evaluation and managment of basal cell carcinoma x2. Patient has no other skin complaints today.  Remainder of the HPI, Meds, PMH, Allergies, FH, and SH was reviewed in chart.      Past Medical History:   Diagnosis Date     Basal cell carcinoma        Past Surgical History:   Procedure Laterality Date     APPENDECTOMY       BLADDER SUSPENSION       HYSTERECTOMY       HYSTERECTOMY       OOPHORECTOMY       TOTAL HIP ARTHROPLASTY Left      TOTAL KNEE ARTHROPLASTY Right         Family History   Problem Relation Age of Onset     Dementia Mother      Myocardial Infarction Father      No Known Problems Sister      No Known Problems Daughter      No Known Problems Maternal Grandmother      No Known Problems Maternal Grandfather      No Known Problems Paternal Grandmother      No Known Problems Paternal Grandfather      No Known Problems Maternal Aunt      No Known Problems Paternal Aunt      Hereditary Breast and Ovarian Cancer Syndrome No family hx of      Breast Cancer No family hx of      Cancer No family hx of      Colon Cancer No family hx of      Endometrial Cancer No family hx of      Ovarian Cancer No family hx of        Social History     Socioeconomic History     Marital status:      Spouse name: Not on file     Number of children: Not on file     Years of education: Not on file     Highest education level: Not on file   Occupational History     Not on file   Tobacco Use     Smoking status: Former Smoker     Smokeless tobacco: Former User     Tobacco comment: in college    Substance and Sexual Activity     Alcohol use: Yes     Comment: holidays or special occasions      Drug use: Not on file     Sexual activity: Not on file   Other Topics Concern     Not on file   Social History Narrative     Not on file     Social Determinants of Health     Financial Resource Strain:      Difficulty of Paying Living Expenses:     Food Insecurity:      Worried About Running Out of Food in the Last Year:      Ran Out of Food in the Last Year:    Transportation Needs:      Lack of Transportation (Medical):      Lack of Transportation (Non-Medical):    Physical Activity:      Days of Exercise per Week:      Minutes of Exercise per Session:    Stress:      Feeling of Stress :    Social Connections:      Frequency of Communication with Friends and Family:      Frequency of Social Gatherings with Friends and Family:      Attends [employer] Services:      Active Member of Clubs or Organizations:      Attends Club or Organization Meetings:      Marital Status:    Intimate Partner Violence:      Fear of Current or Ex-Partner:      Emotionally Abused:      Physically Abused:      Sexually Abused:        Outpatient Encounter Medications as of 7/13/2021   Medication Sig Dispense Refill     amoxicillin (AMOXIL) 500 MG capsule Take 4 capsules (2,000 mg) by mouth once for 1 dose Prior to dental work 4 capsule 0     aspirin 81 MG tablet Take by mouth daily       aspirin-acetaminophen-caffeine (EXCEDRIN MIGRAINE) 250-250-65 MG tablet Take 1 tablet by mouth every 6 hours as needed for headaches       coenzyme Q10 (CO-Q10) 30 MG capsule Take 3 capsules (90 mg) by mouth daily 100 capsule      Flaxseed, Linseed, (FLAX SEED OIL) 1000 MG capsule Take 1 capsule (1,000 mg) by mouth daily       ibuprofen (ADVIL/MOTRIN) 600 MG tablet Take 1 tablet (600 mg) by mouth every 6 hours as needed for moderate pain       Multiple Vitamins-Minerals (PRESERVISION AREDS 2) CAPS Take 1 capsule by mouth daily       multivitamin (DEKAS ESSENTIAL) capsule Take 1 capsule by mouth daily       niacin ER (NIASPAN) 500 MG CR tablet Take 2 tablets (1,000 mg) by mouth At Bedtime       omega-3 acid ethyl esters (LOVAZA) 1 g capsule Take 2 capsules (2 g) by mouth daily       Plant Sterols and Stanols (CHOLESTOFF) 450 MG TABS Take 2 tablets by mouth 3 times daily (with meals)        rosuvastatin (CRESTOR) 20 MG tablet Take 1 tablet (20 mg) by mouth three times a week       VITAMIN D, CHOLECALCIFEROL, PO Take 1,000 Units by mouth daily       No facility-administered encounter medications on file as of 7/13/2021.             O:   NAD, WDWN, Alert & Oriented, Mood & Affect wnl, Vitals stable   Here today alone   BP (!) 155/95   Pulse 85   SpO2 96%    General appearance normal   Vitals stable   Alert, oriented and in no acute distress     R ant shoulder 1.5cm red plaque  L mid back 1.1cm red plaque      Eyes: Conjunctivae/lids:Normal     ENT: Lips, buccal mucosa, tongue: normal    MSK:Normal    Cardiovascular: peripheral edema none    Pulm: Breathing Normal    Neuro/Psych: Orientation:Alert and Orientedx3 ; Mood/Affect:normal       MICRO:     R ant shoulder (blue/red):Unremarkable epidermis and dermis.  No concerning areas for malignancy.     L mid back (green red):Unremarkable epidermis and dermis.  No concerning areas for malignancy.     A/P:  1. R ant shoulder basal cell carcinoma   EXCISION OF BASAL CELL CARCINOMA, Margins confirmed with FROZEN SECTIONS AND Second intent: After thorough discussion of PGACAC, consent obtained, anesthesia and prep, the margins of the lesion were identified and an elliptical incision was made encompassing the lesion with 3mm margin. The incisions were made through the skin and down to and including the superficial dermis.  The lesion was removed en bloc and submitted for frozen section pathologic review. Clear margins obtained (2cm).    REPAIR SECOND INTENT: We discussed the options for wound management in full with the patient including risks/benefits/possible outcomes. Decision made to allow the wound to heal by second intention. EBL minimal; complications none; wound care routine.  The patient was discharged in good condition and will return in one month or prn for wound evaluation.     2. L mid back basal cell carcinoma   EXCISION OF BASAL CELL CARCINOMA,  Margins confirmed with FROZEN SECTIONS AND Second intent: After thorough discussion of PGACAC, consent obtained, anesthesia and prep, the margins of the lesion were identified and an elliptical incision was made encompassing the lesion with 3mm margin. The incisions were made through the skin and down to and including the superficial dermis.  The lesion was removed en bloc and submitted for frozen section pathologic review. Clear margins obtained (1.7cm).    REPAIR SECOND INTENT: We discussed the options for wound management in full with the patient including risks/benefits/possible outcomes. Decision made to allow the wound to heal by second intention. EBL minimal; complications none; wound care routine.  The patient was discharged in good condition and will return in one month or prn for wound evaluation.     It was a pleasure speaking to Romana Felipe today.  Previous clinic notes and pertinent laboratory tests were reviewed prior to Romana Felipe's visit.  Signs and Symptoms of skin cancer discussed with patient.  Patient encouraged to perform monthly skin exams.  UV precautions reviewed with patient.  Risks of non-melanoma skin cancer discussed with patient   Return to clinic 6 months

## 2021-07-13 NOTE — PATIENT INSTRUCTIONS
Open Wound Care     For shoulder and back        ? No strenuous activity for 48 hours    ? Take Tylenol as needed for discomfort.                                                .         ? Do not drink alcoholic beverages for 48 hours.    ? Keep the pressure bandage in place for 24 hours. If the bandage becomes blood tinged or loose, reinforce it with gauze and tape.        (Refer to the reverse side of this page for management of bleeding).    ? Remove bandage in 24 hours and begin wound care as follows:     1. Clean area with tap water using a Q tip or gauze pad, (shower / bathe normally)  2. Dry wound with Q tip or gauze pad  3. Apply Aquaphor, Vaseline, Polysporin or Bacitracin Ointment with a Q tip    Do NOT use Neosporin Ointment *  4. Cover the wound with a band-aid or nonstick gauze pad and paper tape.  5. Repeat wound care once a day until wound is completely healed.    It is an old wives tale that a wound heals better when it is exposed to air and allowed to dry out. The wound will heal faster with a better cosmetic result if it is kept moist with ointment and covered with a bandage.  Do not let the wound dry out.      Supplies Needed:                Qtips or gauze pads                Polysporin or Bacitracin Ointment                Bandaids or nonstick gauze pads and paper tape    Wound care kits and brown paper tape are available for purchase at   the pharmacy.    BLEEDIN. Use tightly rolled up gauze or cloth to apply direct pressure over the bandage for 20   minutes.  2. Reapply pressure for an additional 20 minutes if necessary  3. Call the office or go to the nearest emergency room if pressure fails to stop the bleeding.  4. Use additional gauze and tape to maintain pressure once the bleeding has stopped.  5. Begin wound care 24 hours after surgery as directed.                  WOUND HEALING    1. One week after surgery a pink / red halo will form around the outside of the wound.   This is new  skin.  2. The center of the wound will appear yellowish white and produce some drainage.  3. The pink halo will slowly migrate in toward the center of the wound until the wound is covered with new shiny pink skin.  4. There will be no more drainage when the wound is completely healed.  5. It will take six months to one year for the redness to fade.  6. The scar may be itchy, tight and sensitive to extreme temperatures for a year after the surgery.  7. Massaging the area several times a day for several minutes after the wound is completely healed will help the scar soften and normalize faster. Begin massage only after healing is complete.      In case of emergency call: Dr Westbrook: [phone]     [facility]: [phone]    [facility]: [phone]

## 2021-07-21 ENCOUNTER — RECORDS - HEALTHEAST (OUTPATIENT)
Dept: ADMINISTRATIVE | Facility: CLINIC | Age: 76
End: 2021-07-21

## 2021-07-28 ENCOUNTER — HOSPITAL ENCOUNTER (OUTPATIENT)
Dept: GENERAL RADIOLOGY | Facility: CLINIC | Age: 76
Discharge: HOME | End: 2021-07-28
Attending: INTERNAL MEDICINE | Admitting: INTERNAL MEDICINE
Payer: COMMERCIAL

## 2021-07-28 ENCOUNTER — OFFICE VISIT (OUTPATIENT)
Dept: FAMILY MEDICINE | Facility: CLINIC | Age: 76
End: 2021-07-28
Payer: COMMERCIAL

## 2021-07-28 VITALS
BODY MASS INDEX: 24.69 KG/M2 | HEART RATE: 67 BPM | HEIGHT: 64 IN | OXYGEN SATURATION: 98 % | WEIGHT: 144.6 LBS | RESPIRATION RATE: 20 BRPM | TEMPERATURE: 97.2 F | SYSTOLIC BLOOD PRESSURE: 128 MMHG | DIASTOLIC BLOOD PRESSURE: 80 MMHG

## 2021-07-28 DIAGNOSIS — R06.2 WHEEZING: ICD-10-CM

## 2021-07-28 DIAGNOSIS — Z00.00 ENCOUNTER FOR MEDICARE ANNUAL WELLNESS EXAM: Primary | ICD-10-CM

## 2021-07-28 DIAGNOSIS — Z12.31 VISIT FOR SCREENING MAMMOGRAM: ICD-10-CM

## 2021-07-28 DIAGNOSIS — M85.89 OSTEOPENIA OF MULTIPLE SITES: ICD-10-CM

## 2021-07-28 DIAGNOSIS — E78.5 HYPERLIPIDEMIA LDL GOAL <100: ICD-10-CM

## 2021-07-28 LAB
ANION GAP SERPL CALCULATED.3IONS-SCNC: 7 MMOL/L (ref 3–14)
BUN SERPL-MCNC: 12 MG/DL (ref 7–30)
CALCIUM SERPL-MCNC: 9.3 MG/DL (ref 8.5–10.1)
CHLORIDE BLD-SCNC: 106 MMOL/L (ref 94–109)
CHOLEST SERPL-MCNC: 169 MG/DL
CO2 SERPL-SCNC: 27 MMOL/L (ref 20–32)
CREAT SERPL-MCNC: 0.69 MG/DL (ref 0.52–1.04)
FASTING STATUS PATIENT QL REPORTED: YES
GFR SERPL CREATININE-BSD FRML MDRD: 85 ML/MIN/1.73M2
GLUCOSE BLD-MCNC: 94 MG/DL (ref 70–99)
HDLC SERPL-MCNC: 72 MG/DL
LDLC SERPL CALC-MCNC: 68 MG/DL
NONHDLC SERPL-MCNC: 97 MG/DL
POTASSIUM BLD-SCNC: 4.1 MMOL/L (ref 3.4–5.3)
SODIUM SERPL-SCNC: 140 MMOL/L (ref 133–144)
TRIGL SERPL-MCNC: 146 MG/DL

## 2021-07-28 PROCEDURE — 36415 COLL VENOUS BLD VENIPUNCTURE: CPT | Performed by: INTERNAL MEDICINE

## 2021-07-28 PROCEDURE — 80048 BASIC METABOLIC PNL TOTAL CA: CPT | Performed by: INTERNAL MEDICINE

## 2021-07-28 PROCEDURE — 71046 X-RAY EXAM CHEST 2 VIEWS: CPT

## 2021-07-28 PROCEDURE — 80061 LIPID PANEL: CPT | Performed by: INTERNAL MEDICINE

## 2021-07-28 PROCEDURE — 99397 PER PM REEVAL EST PAT 65+ YR: CPT | Performed by: INTERNAL MEDICINE

## 2021-07-28 PROCEDURE — 99214 OFFICE O/P EST MOD 30 MIN: CPT | Mod: 25 | Performed by: INTERNAL MEDICINE

## 2021-07-28 RX ORDER — ROSUVASTATIN CALCIUM 20 MG/1
20 TABLET, COATED ORAL DAILY
Qty: 90 TABLET | Refills: 3 | Status: SHIPPED | OUTPATIENT
Start: 2021-07-28 | End: 2022-08-02

## 2021-07-28 ASSESSMENT — ACTIVITIES OF DAILY LIVING (ADL): CURRENT_FUNCTION: NO ASSISTANCE NEEDED

## 2021-07-28 ASSESSMENT — MIFFLIN-ST. JEOR: SCORE: 1130.9

## 2021-07-28 NOTE — PROGRESS NOTES
"SUBJECTIVE:   Romana Felipe is a 76 year old female who presents for Preventive Visit.    Previously followed at [facility]    Patient has been advised of split billing requirements and indicates understanding: Yes   Are you in the first 12 months of your Medicare coverage?  No    Healthy Habits:    In general, how would you rate your overall health?  Very good    Frequency of exercise:  6-7 days/week    Duration of exercise:  Less than 15 minutes    Do you usually eat at least 4 servings of fruit and vegetables a day, include whole grains    & fiber and avoid regularly eating high fat or \"junk\" foods?  Yes    Taking medications regularly:  Yes    Barriers to taking medications:  None    Medication side effects:  None    Ability to successfully perform activities of daily living:  No assistance needed    Home Safety:  Lack of grab bars in the bathroom    Hearing Impairment:  No hearing concerns    In the past 6 months, have you been bothered by leaking of urine?  No    In general, how would you rate your overall mental or emotional health?  Very good      PHQ-2 Total Score:    Additional concerns today:  Yes    Do you feel safe in your environment? Yes    Have you ever done Advance Care Planning? (For example, a Health Directive, POLST, or a discussion with a medical provider or your loved ones about your wishes): Yes, advance care planning is on file.     Hyperlipidemia: takes crestor 3 x week; has not had side effects but also did not take daily     Fatigue: wonders if stress due to  illness; being outdoors and sleep at night helps.    History of sinus infection: 4+ years ago.  Was severe and since then 'sinus has never been the same'.  Has raspy cough, ear plugging, mucus in throat - intermittent. Denies shortness of breath, wheezing. Not prone to allergies or asthma.  Smoked for a few years in college       Fall risk  Fallen 2 or more times in the past year?: No  Any fall with injury in the past year?: " No    Cognitive Screening   1) Repeat 3 items (Leader, Season, Table)    2) Clock draw: NORMAL  3) 3 item recall: Recalls 3 objects  Results: 3 items recalled: COGNITIVE IMPAIRMENT LESS LIKELY    Mini-CogTM Copyright S Liliana. Licensed by the author for use in [facility]; reprinted with permission (darinel@[facility]). All rights reserved.      Do you have sleep apnea, excessive snoring or daytime drowsiness?: no    Reviewed and updated as needed this visit by clinical staff  Tobacco  Allergies  Meds  Problems  Med Hx  Surg Hx  Fam Hx  Soc Hx          Reviewed and updated as needed this visit by Provider   Allergies  Meds  Problems            Social History     Tobacco Use     Smoking status: Former Smoker     Smokeless tobacco: Former User     Tobacco comment: in college    Substance Use Topics     Alcohol use: Yes     Comment: holidays or special occasions      If you drink alcohol do you typically have >3 drinks per day or >7 drinks per week? No    Alcohol Use 7/28/2021   Prescreen: >3 drinks/day or >7 drinks/week? No       Current providers sharing in care for this patient include:   Patient Care Team:  Vicky Mclean DO as PCP - General (Internal Medicine)  Williams Westbrook MD as Assigned Surgical Provider    The following health maintenance items are reviewed in Epic and correct as of today:  Health Maintenance Due   Topic Date Due     ANNUAL REVIEW OF  ORDERS  Never done     HEPATITIS C SCREENING  Never done     FALL RISK ASSESSMENT  Never done     Current Outpatient Medications   Medication Sig Dispense Refill     aspirin-acetaminophen-caffeine (EXCEDRIN MIGRAINE) 250-250-65 MG tablet Take 1 tablet by mouth every 6 hours as needed for headaches       ibuprofen (ADVIL/MOTRIN) 600 MG tablet Take 1 tablet (600 mg) by mouth every 6 hours as needed for moderate pain       melatonin 5 MG tablet Take 5 mg by mouth nightly as needed for sleep       rosuvastatin (CRESTOR) 20 MG  "tablet Take 1 tablet (20 mg) by mouth three times a week       VITAMIN D, CHOLECALCIFEROL, PO Take 1,000 Units by mouth daily       amoxicillin (AMOXIL) 500 MG capsule Take 4 capsules (2,000 mg) by mouth once for 1 dose Prior to dental work (Patient not taking: Reported on 7/28/2021) 4 capsule 0     aspirin 81 MG tablet Take by mouth daily (Patient not taking: Reported on 7/28/2021)       Multiple Vitamins-Minerals (PRESERVISION AREDS 2) CAPS Take 1 capsule by mouth daily (Patient not taking: Reported on 7/28/2021)       multivitamin (DEKAS ESSENTIAL) capsule Take 1 capsule by mouth daily (Patient not taking: Reported on 7/28/2021)           Review of Systems  Constitutional, HEENT, cardiovascular, pulmonary, GI, , musculoskeletal, neuro, skin, endocrine and psych systems are negative, except as otherwise noted.    OBJECTIVE:   /80 (BP Location: Right arm, Patient Position: Sitting, Cuff Size: Adult Regular)   Pulse 67   Temp 97.2  F (36.2  C) (Tympanic)   Resp 20   Ht 1.626 m (5' 4\")   Wt 65.6 kg (144 lb 9.6 oz)   SpO2 98%   Breastfeeding No   BMI 24.82 kg/m   Estimated body mass index is 24.82 kg/m  as calculated from the following:    Height as of this encounter: 1.626 m (5' 4\").    Weight as of this encounter: 65.6 kg (144 lb 9.6 oz).  Physical Exam  GENERAL: healthy, alert and no distress  EYES: Eyes grossly normal to inspection, PERRL and conjunctivae and sclerae normal  HENT: ear canals and TM's normal, nose and mouth without ulcers or lesions  NECK: no adenopathy, no asymmetry, masses, or scars and thyroid normal to palpation  RESP: expiratory wheezes throughout  CV: regular rate and rhythm, normal S1 S2, no S3 or S4, no murmur, click or rub, no peripheral edema and peripheral pulses strong  ABDOMEN: soft, nontender, no hepatosplenomegaly, no masses and bowel sounds normal  MS: no gross musculoskeletal defects noted, no edema  SKIN: no suspicious lesions or rashes  NEURO: Normal strength and " "tone, mentation intact and speech normal  PSYCH: mentation appears normal, affect normal/bright    Diagnostic Test Results:  Labs reviewed in Epic  No results found for this or any previous visit (from the past 24 hour(s)).    ASSESSMENT / PLAN:   1. Encounter for Medicare annual wellness exam    2. Hyperlipidemia LDL goal <100 -she is using over 5 medications to manage cholesterol which seems excessive.  Instead, advised to stop all the over-the-counter medications and increase the Crestor from 3 times a week to daily.  Recheck lipids in 2 months  - Lipid panel reflex to direct LDL Fasting; Future  - Basic metabolic panel  (Ca, Cl, CO2, Creat, Gluc, K, Na, BUN); Future  - rosuvastatin (CRESTOR) 20 MG tablet; Take 1 tablet (20 mg) by mouth daily  Dispense: 90 tablet; Refill: 3  - Lipid panel reflex to direct LDL Fasting; Future  - Lipid panel reflex to direct LDL Fasting  - Basic metabolic panel  (Ca, Cl, CO2, Creat, Gluc, K, Na, BUN)    3. Wheezing -patient is a non-smoker and does not have an extensive history of secondhand smoke.  No prior history of asthma.  Will check chest x-ray and get PFTs, ideally would want methacholine challenge test but these are still not being done due to Covid restrictions.  May need a CT.  May need to consider cardiac wheeze?  Follow-up after the PFTs  - XR Chest 2 Views; Future  - General PFT Lab (Please always keep checked); Future  - Pulmonary Function Test; Future    4. Osteopenia of multiple sites -due for updated DEXA 2022 patient opts to continue    5. Visit for screening mammogram -   - *MA Screening Digital Bilateral; Future    Patient has been advised of split billing requirements and indicates understanding: Yes  COUNSELING:  Reviewed preventive health counseling, as reflected in patient instructions    Estimated body mass index is 24.82 kg/m  as calculated from the following:    Height as of this encounter: 1.626 m (5' 4\").    Weight as of this encounter: 65.6 kg (144 lb " 9.6 oz).        She reports that she has quit smoking. She has quit using smokeless tobacco.      Appropriate preventive services were discussed with this patient, including applicable screening as appropriate for cardiovascular disease, diabetes, osteopenia/osteoporosis, and glaucoma.  As appropriate for age/gender, discussed screening for colorectal cancer, prostate cancer, breast cancer, and cervical cancer. Checklist reviewing preventive services available has been given to the patient.    Reviewed patients plan of care and provided an AVS. The Intermediate Care Plan ( asthma action plan, low back pain action plan, and migraine action plan) for Romana meets the Care Plan requirement. This Care Plan has been established and reviewed with the Patient.    Counseling Resources:  ATP IV Guidelines  Pooled Cohorts Equation Calculator  Breast Cancer Risk Calculator  Breast Cancer: Medication to Reduce Risk  FRAX Risk Assessment  ICSI Preventive Guidelines  Dietary Guidelines for Americans, 2010  USDA's MyPlate  ASA Prophylaxis  Lung CA Screening    DO SHARA [facility]    Identified Health Risks:

## 2021-07-28 NOTE — PATIENT INSTRUCTIONS
Patient Education   Personalized Prevention Plan  You are due for the preventive services outlined below.  Your care team is available to assist you in scheduling these services.  If you have already completed any of these items, please share that information with your care team to update in your medical record.  Health Maintenance Due   Topic Date Due     ANNUAL REVIEW OF HM ORDERS  Never done     Discuss Advance Care Planning  Never done     Hepatitis C Screening  Never done     Annual Wellness Visit  Never done     FALL RISK ASSESSMENT  Never done     PHQ-2  01/01/2021        Patient Education   Personalized Prevention Plan  You are due for the preventive services outlined below.  Your care team is available to assist you in scheduling these services.  If you have already completed any of these items, please share that information with your care team to update in your medical record.  Health Maintenance Due   Topic Date Due     ANNUAL REVIEW OF HM ORDERS  Never done     Hepatitis C Screening  Never done     FALL RISK ASSESSMENT  Never done           Cholesterol   1. Stop the CoQ10, flaxseed, fish oil, niacin  2. Labs today  3. Take crestor daily.  4. Recheck cholesterol in 2 months     Sinus/Wheezing  1. Could be non-allergic rhinitis or asthma or allergies  2. Get Chest xray.  If normal, get breathing tests.  You need to have breathing tests done.  Please call the Pulmonary lab at [phone] to schedule  3. Follow-up after breathing tests.

## 2021-07-28 NOTE — RESULT ENCOUNTER NOTE
Cholesterol is improved from 1 year ago, however I still believe you should stop the over-the-counter medicines and continue with the Crestor just to streamline the number of medications that you take and given how high her cholesterols been in the past.  Kidney function electrolytes and blood sugar are normal

## 2021-07-28 NOTE — RESULT ENCOUNTER NOTE
There appears to be inflation of the lungs which can go along with wheezing.  Continue with plan to get the breathing tests.  No pneumonia seen which is good.  There is a possible hiatal hernia but it is not large and would not be contributing to the wheezing

## 2021-07-29 ENCOUNTER — HOSPITAL ENCOUNTER (OUTPATIENT)
Dept: MAMMOGRAPHY | Facility: CLINIC | Age: 76
Discharge: HOME | End: 2021-07-29
Attending: INTERNAL MEDICINE | Admitting: INTERNAL MEDICINE
Payer: COMMERCIAL

## 2021-07-29 DIAGNOSIS — Z12.31 VISIT FOR SCREENING MAMMOGRAM: ICD-10-CM

## 2021-07-29 PROCEDURE — 77063 BREAST TOMOSYNTHESIS BI: CPT

## 2021-07-30 ENCOUNTER — MYC MEDICAL ADVICE (OUTPATIENT)
Dept: FAMILY MEDICINE | Facility: CLINIC | Age: 76
End: 2021-07-30

## 2021-08-20 ENCOUNTER — HOSPITAL ENCOUNTER (OUTPATIENT)
Dept: RESPIRATORY THERAPY | Facility: CLINIC | Age: 76
Discharge: HOME | End: 2021-08-20
Attending: INTERNAL MEDICINE | Admitting: INTERNAL MEDICINE
Payer: COMMERCIAL

## 2021-08-20 DIAGNOSIS — R06.2 WHEEZING: ICD-10-CM

## 2021-08-20 PROCEDURE — 94726 PLETHYSMOGRAPHY LUNG VOLUMES: CPT | Mod: 26 | Performed by: INTERNAL MEDICINE

## 2021-08-20 PROCEDURE — 999N000105 HC STATISTIC NO DOCUMENTATION TO SUPPORT CHARGE

## 2021-08-20 PROCEDURE — 94060 EVALUATION OF WHEEZING: CPT

## 2021-08-20 PROCEDURE — 94729 DIFFUSING CAPACITY: CPT

## 2021-08-20 PROCEDURE — 94729 DIFFUSING CAPACITY: CPT | Mod: 26 | Performed by: INTERNAL MEDICINE

## 2021-08-20 PROCEDURE — 94060 EVALUATION OF WHEEZING: CPT | Mod: 26 | Performed by: INTERNAL MEDICINE

## 2021-08-20 PROCEDURE — 94726 PLETHYSMOGRAPHY LUNG VOLUMES: CPT

## 2021-08-20 PROCEDURE — 250N000009 HC RX 250: Performed by: INTERNAL MEDICINE

## 2021-08-20 PROCEDURE — 94640 AIRWAY INHALATION TREATMENT: CPT

## 2021-08-20 RX ORDER — ALBUTEROL SULFATE 0.83 MG/ML
2.5 SOLUTION RESPIRATORY (INHALATION) ONCE
Status: COMPLETED | OUTPATIENT
Start: 2021-08-20 | End: 2021-08-20

## 2021-08-20 RX ADMIN — ALBUTEROL SULFATE 2.5 MG: 2.5 SOLUTION RESPIRATORY (INHALATION) at 13:30

## 2021-08-21 LAB
DLCOUNC-%PRED-PRE: 92 %
DLCOUNC-PRE: 17.61 ML/MIN/MMHG
DLCOUNC-PRED: 19.05 ML/MIN/MMHG
ERV-%PRED-PRE: 87 %
ERV-PRE: 0.57 L
ERV-PRED: 0.65 L
EXPTIME-PRE: 7.8 SEC
FEF2575-%PRED-POST: 115 %
FEF2575-%PRED-PRE: 92 %
FEF2575-POST: 1.94 L/SEC
FEF2575-PRE: 1.55 L/SEC
FEF2575-PRED: 1.67 L/SEC
FEFMAX-%PRED-PRE: 113 %
FEFMAX-PRE: 5.84 L/SEC
FEFMAX-PRED: 5.16 L/SEC
FEV1-%PRED-PRE: 111 %
FEV1-PRE: 2.27 L
FEV1FEV6-PRE: 74 %
FEV1FEV6-PRED: 78 %
FEV1FVC-PRE: 72 %
FEV1FVC-PRED: 77 %
FEV1SVC-PRE: 70 %
FEV1SVC-PRED: 69 %
FIFMAX-PRE: 3.95 L/SEC
FRCPLETH-%PRED-PRE: 113 %
FRCPLETH-PRE: 3.07 L
FRCPLETH-PRED: 2.72 L
FVC-%PRED-PRE: 117 %
FVC-PRE: 3.14 L
FVC-PRED: 2.66 L
IC-%PRED-PRE: 116 %
IC-PRE: 2.66 L
IC-PRED: 2.29 L
RVPLETH-%PRED-PRE: 115 %
RVPLETH-PRE: 2.5 L
RVPLETH-PRED: 2.16 L
TLCPLETH-%PRED-PRE: 116 %
TLCPLETH-PRE: 5.74 L
TLCPLETH-PRED: 4.94 L
VA-%PRED-PRE: 108 %
VA-PRE: 5.07 L
VC-%PRED-PRE: 110 %
VC-PRE: 3.23 L
VC-PRED: 2.93 L

## 2021-09-12 ENCOUNTER — HEALTH MAINTENANCE LETTER (OUTPATIENT)
Age: 76
End: 2021-09-12

## 2021-10-05 ENCOUNTER — ALLIED HEALTH/NURSE VISIT (OUTPATIENT)
Dept: FAMILY MEDICINE | Facility: CLINIC | Age: 76
End: 2021-10-05
Payer: COMMERCIAL

## 2021-10-05 DIAGNOSIS — Z23 NEED FOR PROPHYLACTIC VACCINATION AND INOCULATION AGAINST INFLUENZA: Primary | ICD-10-CM

## 2021-10-05 PROCEDURE — 90662 IIV NO PRSV INCREASED AG IM: CPT

## 2021-10-05 PROCEDURE — 99207 PR NO CHARGE NURSE ONLY: CPT

## 2021-10-05 PROCEDURE — G0008 ADMIN INFLUENZA VIRUS VAC: HCPCS

## 2021-10-05 NOTE — NURSING NOTE
Patient presents to influenza program requesting influenza vaccination.  Standing orders implemented.    Vaccination given by

## 2021-10-27 ENCOUNTER — TRANSFERRED RECORDS (OUTPATIENT)
Dept: HEALTH INFORMATION MANAGEMENT | Facility: CLINIC | Age: 76
End: 2021-10-27
Payer: COMMERCIAL

## 2022-05-25 ENCOUNTER — TELEPHONE (OUTPATIENT)
Dept: OTOLARYNGOLOGY | Facility: CLINIC | Age: 77
End: 2022-05-25
Payer: COMMERCIAL

## 2022-05-25 NOTE — TELEPHONE ENCOUNTER
Health Call Center    Phone Message    May a detailed message be left on voicemail: yes     Reason for Call: Appointment Intake    Referring Provider Name: N/A  Diagnosis and/or Symptoms: Not hearing well, hears feel plugged, ears are itchy. Symptoms came 5 years ago after her serve sinus infection. Wants to meet with  for a standard ear check to find out specific diagnosis and issue with ear. Writer unsure on how to schedule with  due to no specific diagnosis being available.     Action Taken: Message routed to:  [facility] Ent    Travel Screening: Not Applicable                                                                    '

## 2022-05-26 NOTE — TELEPHONE ENCOUNTER
[facility] Call Center    Phone Message    May a detailed message be left on voicemail: yes     Reason for Call: Other: ROBERTH that writer scheduled Romana as a NEW RHINOLOGY appointment on 07/22/22. Her main concern seemed to be her sinuses. She did state that she wants her ears checked during this appointment as well. If writer scheduling improperly, please reach out to patient with rescheduling options. Thank you!     Action Taken: Message routed to:  Other: [facility]    Travel Screening: Not Applicable

## 2022-07-19 NOTE — PROGRESS NOTES
History of Present Illness - Romana Felipe is a very pleasant 77 year old female here to see me for the first time for both sinus and ear issues.    With regards to the sinuses, she reports chronic problems for about the last 5 years.  She tells me that it started with a bad sore throat and cough.  She went to urgent care and was treated for sinus infection and was treated with antibiotics.  That did seem to help the cough temporarily.  And ever since then her RIGHT ear seems to itch, plug, and does not hear well.    An audiogram was done yesterday 7/21/22. It shows a very steeply down sloping sensorineural hearing loss above 500Hz.  It levels off at a severe level of hearing loss at all frequencies above 3000Hz.  No conductive hearing loss and no asymemtry    Past Medical History -   Patient Active Problem List   Diagnosis     Osteopenia of multiple sites     Asymptomatic Postmenopausal Status     Scoliosis     Migraine     History of basal cell carcinoma       Current Medications -   Current Outpatient Medications:      amoxicillin (AMOXIL) 500 MG capsule, Take 4 capsules (2,000 mg) by mouth once for 1 dose Prior to dental work (Patient not taking: Reported on 7/28/2021), Disp: 4 capsule, Rfl: 0     aspirin 81 MG tablet, Take by mouth daily (Patient not taking: Reported on 7/28/2021), Disp: , Rfl:      aspirin-acetaminophen-caffeine (EXCEDRIN MIGRAINE) 250-250-65 MG tablet, Take 1 tablet by mouth every 6 hours as needed for headaches, Disp:  , Rfl:      ibuprofen (ADVIL/MOTRIN) 600 MG tablet, Take 1 tablet (600 mg) by mouth every 6 hours as needed for moderate pain, Disp:  , Rfl:      melatonin 5 MG tablet, Take 5 mg by mouth nightly as needed for sleep, Disp: , Rfl:      Multiple Vitamins-Minerals (PRESERVISION AREDS 2) CAPS, Take 1 capsule by mouth daily (Patient not taking: Reported on 7/28/2021), Disp: , Rfl:      multivitamin (DEKAS ESSENTIAL) capsule, Take 1 capsule by mouth daily (Patient not taking:  Reported on 2021), Disp:  , Rfl:      rosuvastatin (CRESTOR) 20 MG tablet, Take 1 tablet (20 mg) by mouth daily, Disp: 90 tablet, Rfl: 3     VITAMIN D, CHOLECALCIFEROL, PO, Take 1,000 Units by mouth daily, Disp: , Rfl:     Allergies - No Known Allergies    Social History -   Social History     Socioeconomic History     Marital status:    Tobacco Use     Smoking status: Former Smoker     Years: 2.00     Quit date: 1965     Years since quittin.5     Smokeless tobacco: Former User     Tobacco comment: in college    Vaping Use     Vaping Use: Never used   Substance and Sexual Activity     Alcohol use: Yes     Comment: holidays or special occasions      Drug use: Never     Sexual activity: Not Currently       Family History -   Family History   Problem Relation Age of Onset     Dementia Mother      Myocardial Infarction Father      No Known Problems Sister      No Known Problems Daughter      No Known Problems Maternal Grandmother      No Known Problems Maternal Grandfather      No Known Problems Paternal Grandmother      No Known Problems Paternal Grandfather      No Known Problems Maternal Aunt      No Known Problems Paternal Aunt      Hereditary Breast and Ovarian Cancer Syndrome No family hx of      Breast Cancer No family hx of      Cancer No family hx of      Colon Cancer No family hx of      Endometrial Cancer No family hx of      Ovarian Cancer No family hx of        Review of Systems - As per HPI and PMHx, otherwise 10+ system review of the head and neck, and general constitution is negative.    Physical Exam  BP (!) 166/74 (BP Location: Right arm, Patient Position: Chair, Cuff Size: Adult Regular)   Pulse 77   SpO2 94%     General - The patient is well nourished and well developed, and appears to have good nutritional status.  Alert and oriented to person and place, answers questions and cooperates with examination appropriately.   Head and Face - Normocephalic and atraumatic, with no  gross asymmetry noted of the contour of the facial features.  The facial nerve is intact, with strong symmetric movements.  Voice and Breathing - The patient was breathing comfortably without the use of accessory muscles. There was no wheezing, stridor, or stertor.  The patients voice was clear and strong, and had appropriate pitch and quality.  Ears - The tympanic membranes are normal in appearance, bony landmarks are intact.  No retraction, perforation, or masses.  No fluid or purulence was seen in the external canal or the middle ear. No evidence of infection of the middle ear or external canal, cerumen was normal in appearance.  Eyes - Extraocular movements intact, and the pupils were reactive to light.  Sclera were not icteric or injected, conjunctiva were pink and moist.  Mouth - Examination of the oral cavity showed pink, healthy oral mucosa. No lesions or ulcerations noted.  The tongue was mobile and midline, and the dentition were in good condition.    Throat - The walls of the oropharynx were smooth, pink, moist, symmetric, and had no lesions or ulcerations.  The tonsillar pillars and soft palate were symmetric.  The uvula was midline on elevation.    Neck - Normal midline excursion of the laryngotracheal complex during swallowing.  Full range of motion on passive movement.  Palpation of the occipital, submental, submandibular, internal jugular chain, and supraclavicular nodes did not demonstrate any abnormal lymph nodes or masses.  The carotid pulse was palpable bilaterally.  Palpation of the thyroid was soft and smooth, with no nodules or goiter appreciated.  The trachea was mobile and midline.  Nose - External contour is symmetric, no gross deflection or scars.  Nasal mucosa is pink and moist with no abnormal mucus.  The septum was midline and non-obstructive, turbinates of normal size and position.  No polyps, masses, or purulence noted on examination.      A/P - Romana Felipe is a 77 year old  female  (R05.3) Chronic cough  (primary encounter diagnosis)  (R09.82) Post-nasal drainage  (H93.8X1) Ear fullness, right    For the sinus issues, the physical exam is not terrible.  I have ordered a CT scan of the sinuses, and instructed her that if things get worse then get the CT scan.    (H90.3) Sensorineural hearing loss (SNHL) of both ears  Based on the history, audiogram, and ear exam, I don't find any evidence of medical or surgical disease that would have caused the hearing loss.  Although noise exposure could be a factor, I suspect that this is primarily genetic/presbycusis.    We discussed the natural history of the steady loss of human hearing, and things to help.  I certainly recommend considering hearing aids, and they have my medical clearance to look into being fitted, and information has been provided.    Finally, safety considerations such as loud smoke detectors, alarm clocks, and special aids for the hearing impaired using phones and television were also discussed.      (H60.8X3) Chronic eczematous otitis externa of both ears  For the dry itchy ears I will prescribe long term Dermotic ear drops

## 2022-07-21 ENCOUNTER — OFFICE VISIT (OUTPATIENT)
Dept: AUDIOLOGY | Facility: CLINIC | Age: 77
End: 2022-07-21
Payer: COMMERCIAL

## 2022-07-21 DIAGNOSIS — H90.3 SENSORINEURAL HEARING LOSS, BILATERAL: Primary | ICD-10-CM

## 2022-07-21 PROCEDURE — 92557 COMPREHENSIVE HEARING TEST: CPT | Performed by: AUDIOLOGIST

## 2022-07-21 PROCEDURE — 92550 TYMPANOMETRY & REFLEX THRESH: CPT | Performed by: AUDIOLOGIST

## 2022-07-21 NOTE — PROGRESS NOTES
AUDIOLOGY REPORT    SUBJECTIVE:  Romana Felipe is a 77 year old female who was seen in the Audiology Clinic [facility] on 7/21/22 for audiologic evaluation, referred by self.  The patient reports a decline in right ear hearing following a sinus infection about a year ago. The patient denies  bilateral tinnitus, bilateral otalgia, bilateral drainage, bilateral aural fullness, family history of hearing loss, history of noise exposure, and dizziness. The patient notes difficulty with communication in a variety of listening situations. Patient was unaccompanied to today's visit.     OBJECTIVE:    Otoscopic exam indicates partial obstruction with cerumen bilaterally     Pure Tone Thresholds assessed using standard techniques  audiometry with good  reliability from 250-8000 Hz bilaterally using circumaural headphones     RIGHT:  normal and borderline-normal hearing sensitivity through 500 Hz then a moderate sensorineural hearing loss    LEFT:    normal hearing sensitivity through 500 Hz then a mild to moderate sensorineural hearing loss    Tympanogram:    RIGHT: restricted eardrum mobility (Type As)    LEFT:   normal eardrum mobility    Reflexes (reported by stimulus ear): 1000 Hz  RIGHT: Ipsilateral is absent at frequencies tested  RIGHT: Contralateral is present at normal levels  LEFT:   Ipsilateral is present at normal levels  LEFT:   Contralateral is elevated    Speech Reception Threshold:    RIGHT: 35 dB HL    LEFT:   45 dB HL    Word Recognition Score:     RIGHT: 88% at 80 dB HL using NU-6 recorded word list.    LEFT:   88% at 80 dB HL using NU-6 recorded word list.    ASSESSMENT:   Bilateral sensorineural hearing loss      Today s results were discussed with the patient in detail. Patient requested and was provided a copy of her audiogram.     PLAN:  Patient was counseled regarding hearing loss and impact on communication.  Patient is a good candidate for amplification at this  time. Handout on good communication strategies, and hearing aid use was given to patient. It is recommended that the patient keep her ENT appointment tomorrow and return to audiology should she like a hearing aid consultation.  Please call this clinic with questions regarding these results or recommendations.    Obed Valdes CCC-A  Licensed Audiologist #[id number]  7/21/2022    CC: Dr. Frost

## 2022-07-22 ENCOUNTER — OFFICE VISIT (OUTPATIENT)
Dept: OTOLARYNGOLOGY | Facility: CLINIC | Age: 77
End: 2022-07-22
Payer: COMMERCIAL

## 2022-07-22 VITALS — HEART RATE: 77 BPM | SYSTOLIC BLOOD PRESSURE: 166 MMHG | DIASTOLIC BLOOD PRESSURE: 74 MMHG | OXYGEN SATURATION: 94 %

## 2022-07-22 DIAGNOSIS — H60.8X3 CHRONIC ECZEMATOUS OTITIS EXTERNA OF BOTH EARS: ICD-10-CM

## 2022-07-22 DIAGNOSIS — H90.3 SENSORINEURAL HEARING LOSS (SNHL) OF BOTH EARS: ICD-10-CM

## 2022-07-22 DIAGNOSIS — R09.82 POST-NASAL DRAINAGE: ICD-10-CM

## 2022-07-22 DIAGNOSIS — R05.3 CHRONIC COUGH: Primary | ICD-10-CM

## 2022-07-22 DIAGNOSIS — H93.8X1 EAR FULLNESS, RIGHT: ICD-10-CM

## 2022-07-22 PROCEDURE — 99204 OFFICE O/P NEW MOD 45 MIN: CPT | Performed by: OTOLARYNGOLOGY

## 2022-07-22 RX ORDER — FLUOCINOLONE ACETONIDE 0.11 MG/ML
5 OIL AURICULAR (OTIC) EVERY OTHER DAY
Qty: 20 ML | Refills: 4 | Status: SHIPPED | OUTPATIENT
Start: 2022-07-22 | End: 2022-08-02

## 2022-07-22 NOTE — LETTER
7/22/2022         RE: Romana Felipe  [address] [facility] [address]        Dear Colleague,    Thank you for referring your patient, Romana Felipe, to the [facility]. Please see a copy of my visit note below.    History of Present Illness - Romana Felipe is a very pleasant 77 year old female here to see me for the first time for both sinus and ear issues.    With regards to the sinuses, she reports chronic problems for about the last 5 years.  She tells me that it started with a bad sore throat and cough.  She went to urgent care and was treated for sinus infection and was treated with antibiotics.  That did seem to help the cough temporarily.  And ever since then her RIGHT ear seems to itch, plug, and does not hear well.    An audiogram was done yesterday 7/21/22. It shows a very steeply down sloping sensorineural hearing loss above 500Hz.  It levels off at a severe level of hearing loss at all frequencies above 3000Hz.  No conductive hearing loss and no asymemtry    Past Medical History -   Patient Active Problem List   Diagnosis     Osteopenia of multiple sites     Asymptomatic Postmenopausal Status     Scoliosis     Migraine     History of basal cell carcinoma       Current Medications -   Current Outpatient Medications:      amoxicillin (AMOXIL) 500 MG capsule, Take 4 capsules (2,000 mg) by mouth once for 1 dose Prior to dental work (Patient not taking: Reported on 7/28/2021), Disp: 4 capsule, Rfl: 0     aspirin 81 MG tablet, Take by mouth daily (Patient not taking: Reported on 7/28/2021), Disp: , Rfl:      aspirin-acetaminophen-caffeine (EXCEDRIN MIGRAINE) 250-250-65 MG tablet, Take 1 tablet by mouth every 6 hours as needed for headaches, Disp:  , Rfl:      ibuprofen (ADVIL/MOTRIN) 600 MG tablet, Take 1 tablet (600 mg) by mouth every 6 hours as needed for moderate pain, Disp:  , Rfl:      melatonin 5 MG tablet, Take 5 mg by mouth nightly as needed for sleep,  Disp: , Rfl:      Multiple Vitamins-Minerals (PRESERVISION AREDS 2) CAPS, Take 1 capsule by mouth daily (Patient not taking: Reported on 2021), Disp: , Rfl:      multivitamin (DEKAS ESSENTIAL) capsule, Take 1 capsule by mouth daily (Patient not taking: Reported on 2021), Disp:  , Rfl:      rosuvastatin (CRESTOR) 20 MG tablet, Take 1 tablet (20 mg) by mouth daily, Disp: 90 tablet, Rfl: 3     VITAMIN D, CHOLECALCIFEROL, PO, Take 1,000 Units by mouth daily, Disp: , Rfl:     Allergies - No Known Allergies    Social History -   Social History     Socioeconomic History     Marital status:    Tobacco Use     Smoking status: Former Smoker     Years: 2.00     Quit date: 1965     Years since quittin.5     Smokeless tobacco: Former User     Tobacco comment: in college    Vaping Use     Vaping Use: Never used   Substance and Sexual Activity     Alcohol use: Yes     Comment: holidays or special occasions      Drug use: Never     Sexual activity: Not Currently       Family History -   Family History   Problem Relation Age of Onset     Dementia Mother      Myocardial Infarction Father      No Known Problems Sister      No Known Problems Daughter      No Known Problems Maternal Grandmother      No Known Problems Maternal Grandfather      No Known Problems Paternal Grandmother      No Known Problems Paternal Grandfather      No Known Problems Maternal Aunt      No Known Problems Paternal Aunt      Hereditary Breast and Ovarian Cancer Syndrome No family hx of      Breast Cancer No family hx of      Cancer No family hx of      Colon Cancer No family hx of      Endometrial Cancer No family hx of      Ovarian Cancer No family hx of        Review of Systems - As per HPI and PMHx, otherwise 10+ system review of the head and neck, and general constitution is negative.    Physical Exam  BP (!) 166/74 (BP Location: Right arm, Patient Position: Chair, Cuff Size: Adult Regular)   Pulse 77   SpO2 94%     General -  The patient is well nourished and well developed, and appears to have good nutritional status.  Alert and oriented to person and place, answers questions and cooperates with examination appropriately.   Head and Face - Normocephalic and atraumatic, with no gross asymmetry noted of the contour of the facial features.  The facial nerve is intact, with strong symmetric movements.  Voice and Breathing - The patient was breathing comfortably without the use of accessory muscles. There was no wheezing, stridor, or stertor.  The patients voice was clear and strong, and had appropriate pitch and quality.  Ears - The tympanic membranes are normal in appearance, bony landmarks are intact.  No retraction, perforation, or masses.  No fluid or purulence was seen in the external canal or the middle ear. No evidence of infection of the middle ear or external canal, cerumen was normal in appearance.  Eyes - Extraocular movements intact, and the pupils were reactive to light.  Sclera were not icteric or injected, conjunctiva were pink and moist.  Mouth - Examination of the oral cavity showed pink, healthy oral mucosa. No lesions or ulcerations noted.  The tongue was mobile and midline, and the dentition were in good condition.    Throat - The walls of the oropharynx were smooth, pink, moist, symmetric, and had no lesions or ulcerations.  The tonsillar pillars and soft palate were symmetric.  The uvula was midline on elevation.    Neck - Normal midline excursion of the laryngotracheal complex during swallowing.  Full range of motion on passive movement.  Palpation of the occipital, submental, submandibular, internal jugular chain, and supraclavicular nodes did not demonstrate any abnormal lymph nodes or masses.  The carotid pulse was palpable bilaterally.  Palpation of the thyroid was soft and smooth, with no nodules or goiter appreciated.  The trachea was mobile and midline.  Nose - External contour is symmetric, no gross deflection  or scars.  Nasal mucosa is pink and moist with no abnormal mucus.  The septum was midline and non-obstructive, turbinates of normal size and position.  No polyps, masses, or purulence noted on examination.      A/P - Romana Felipe is a 77 year old female  (R05.3) Chronic cough  (primary encounter diagnosis)  (R09.82) Post-nasal drainage  (H93.8X1) Ear fullness, right    For the sinus issues, the physical exam is not terrible.  I have ordered a CT scan of the sinuses, and instructed her that if things get worse then get the CT scan.    (H90.3) Sensorineural hearing loss (SNHL) of both ears  Based on the history, audiogram, and ear exam, I don't find any evidence of medical or surgical disease that would have caused the hearing loss.  Although noise exposure could be a factor, I suspect that this is primarily genetic/presbycusis.    We discussed the natural history of the steady loss of human hearing, and things to help.  I certainly recommend considering hearing aids, and they have my medical clearance to look into being fitted, and information has been provided.    Finally, safety considerations such as loud smoke detectors, alarm clocks, and special aids for the hearing impaired using phones and television were also discussed.      (H60.8X3) Chronic eczematous otitis externa of both ears  For the dry itchy ears I will prescribe long term Dermotic ear drops        Again, thank you for allowing me to participate in the care of your patient.        Sincerely,        Viet Frost MD

## 2022-07-22 NOTE — PATIENT INSTRUCTIONS
Per physician instructions.    If you have questions or concerns on any instructions given to you by your provider today or if you need to schedule an appointment, you can reach us at [phone].  Listen to the menu for the Specialty Clinic option.      Thank you!

## 2022-07-22 NOTE — NURSING NOTE
"Chief Complaint   Patient presents with     Sinusitis     Ear Problem       Initial BP (!) 166/74 (BP Location: Right arm, Patient Position: Chair, Cuff Size: Adult Regular)   Pulse 77   SpO2 94%  Estimated body mass index is 24.82 kg/m  as calculated from the following:    Height as of 7/28/21: 1.626 m (5' 4\").    Weight as of 7/28/21: 65.6 kg (144 lb 9.6 oz).  BP completed using cuff size: regular  Medications and allergies reviewed.      Mayra OLMEDO CMA     "

## 2022-08-02 ENCOUNTER — OFFICE VISIT (OUTPATIENT)
Dept: FAMILY MEDICINE | Facility: CLINIC | Age: 77
End: 2022-08-02
Payer: COMMERCIAL

## 2022-08-02 ENCOUNTER — HOSPITAL ENCOUNTER (OUTPATIENT)
Dept: MAMMOGRAPHY | Facility: CLINIC | Age: 77
Discharge: HOME | End: 2022-08-02
Attending: INTERNAL MEDICINE
Payer: COMMERCIAL

## 2022-08-02 ENCOUNTER — HOSPITAL ENCOUNTER (OUTPATIENT)
Dept: CT IMAGING | Facility: CLINIC | Age: 77
Discharge: HOME | End: 2022-08-02
Attending: OTOLARYNGOLOGY
Payer: COMMERCIAL

## 2022-08-02 VITALS
DIASTOLIC BLOOD PRESSURE: 78 MMHG | BODY MASS INDEX: 24.95 KG/M2 | TEMPERATURE: 98.4 F | HEART RATE: 80 BPM | OXYGEN SATURATION: 96 % | SYSTOLIC BLOOD PRESSURE: 132 MMHG | WEIGHT: 140.8 LBS | HEIGHT: 63 IN

## 2022-08-02 DIAGNOSIS — R09.82 POST-NASAL DRAINAGE: ICD-10-CM

## 2022-08-02 DIAGNOSIS — Z11.59 NEED FOR HEPATITIS C SCREENING TEST: ICD-10-CM

## 2022-08-02 DIAGNOSIS — M65.30 TRIGGER FINGER, ACQUIRED: ICD-10-CM

## 2022-08-02 DIAGNOSIS — M85.89 OSTEOPENIA OF MULTIPLE SITES: ICD-10-CM

## 2022-08-02 DIAGNOSIS — R05.3 CHRONIC COUGH: ICD-10-CM

## 2022-08-02 DIAGNOSIS — R53.82 CHRONIC FATIGUE: ICD-10-CM

## 2022-08-02 DIAGNOSIS — Z12.31 VISIT FOR SCREENING MAMMOGRAM: ICD-10-CM

## 2022-08-02 DIAGNOSIS — E78.5 HYPERLIPIDEMIA LDL GOAL <100: ICD-10-CM

## 2022-08-02 DIAGNOSIS — R20.9 ABNORMAL SENSATION OF LOWER EXTREMITY: ICD-10-CM

## 2022-08-02 DIAGNOSIS — Z00.00 ENCOUNTER FOR MEDICARE ANNUAL WELLNESS EXAM: Primary | ICD-10-CM

## 2022-08-02 DIAGNOSIS — H90.3 SENSORINEURAL HEARING LOSS (SNHL) OF BOTH EARS: ICD-10-CM

## 2022-08-02 DIAGNOSIS — H93.8X1 EAR FULLNESS, RIGHT: ICD-10-CM

## 2022-08-02 LAB
ALBUMIN SERPL-MCNC: 4.2 G/DL (ref 3.4–5)
ALP SERPL-CCNC: 87 U/L (ref 40–150)
ALT SERPL W P-5'-P-CCNC: 36 U/L (ref 0–50)
ANION GAP SERPL CALCULATED.3IONS-SCNC: 5 MMOL/L (ref 3–14)
AST SERPL W P-5'-P-CCNC: 15 U/L (ref 0–45)
BASOPHILS # BLD AUTO: 0.1 10E3/UL (ref 0–0.2)
BASOPHILS NFR BLD AUTO: 1 %
BILIRUB SERPL-MCNC: 0.5 MG/DL (ref 0.2–1.3)
BUN SERPL-MCNC: 16 MG/DL (ref 7–30)
CALCIUM SERPL-MCNC: 9.3 MG/DL (ref 8.5–10.1)
CHLORIDE BLD-SCNC: 108 MMOL/L (ref 94–109)
CHOLEST SERPL-MCNC: 188 MG/DL
CO2 SERPL-SCNC: 28 MMOL/L (ref 20–32)
CREAT SERPL-MCNC: 0.67 MG/DL (ref 0.52–1.04)
EOSINOPHIL # BLD AUTO: 0.2 10E3/UL (ref 0–0.7)
EOSINOPHIL NFR BLD AUTO: 3 %
ERYTHROCYTE [DISTWIDTH] IN BLOOD BY AUTOMATED COUNT: 12.8 % (ref 10–15)
FASTING STATUS PATIENT QL REPORTED: NO
GFR SERPL CREATININE-BSD FRML MDRD: 90 ML/MIN/1.73M2
GLUCOSE BLD-MCNC: 89 MG/DL (ref 70–99)
HCT VFR BLD AUTO: 44.6 % (ref 35–47)
HDLC SERPL-MCNC: 70 MG/DL
HGB BLD-MCNC: 14.6 G/DL (ref 11.7–15.7)
LDLC SERPL CALC-MCNC: 79 MG/DL
LYMPHOCYTES # BLD AUTO: 1.2 10E3/UL (ref 0.8–5.3)
LYMPHOCYTES NFR BLD AUTO: 24 %
MCH RBC QN AUTO: 30.9 PG (ref 26.5–33)
MCHC RBC AUTO-ENTMCNC: 32.7 G/DL (ref 31.5–36.5)
MCV RBC AUTO: 95 FL (ref 78–100)
MONOCYTES # BLD AUTO: 0.6 10E3/UL (ref 0–1.3)
MONOCYTES NFR BLD AUTO: 12 %
NEUTROPHILS # BLD AUTO: 3 10E3/UL (ref 1.6–8.3)
NEUTROPHILS NFR BLD AUTO: 59 %
NONHDLC SERPL-MCNC: 118 MG/DL
PLATELET # BLD AUTO: 301 10E3/UL (ref 150–450)
POTASSIUM BLD-SCNC: 4.2 MMOL/L (ref 3.4–5.3)
PROT SERPL-MCNC: 7.8 G/DL (ref 6.8–8.8)
RBC # BLD AUTO: 4.72 10E6/UL (ref 3.8–5.2)
SODIUM SERPL-SCNC: 141 MMOL/L (ref 133–144)
TRIGL SERPL-MCNC: 194 MG/DL
TSH SERPL DL<=0.005 MIU/L-ACNC: 1.14 MU/L (ref 0.4–4)
WBC # BLD AUTO: 5.1 10E3/UL (ref 4–11)

## 2022-08-02 PROCEDURE — 36415 COLL VENOUS BLD VENIPUNCTURE: CPT | Performed by: INTERNAL MEDICINE

## 2022-08-02 PROCEDURE — 85025 COMPLETE CBC W/AUTO DIFF WBC: CPT | Performed by: INTERNAL MEDICINE

## 2022-08-02 PROCEDURE — 80061 LIPID PANEL: CPT | Performed by: INTERNAL MEDICINE

## 2022-08-02 PROCEDURE — 77067 SCR MAMMO BI INCL CAD: CPT

## 2022-08-02 PROCEDURE — 82607 VITAMIN B-12: CPT | Performed by: INTERNAL MEDICINE

## 2022-08-02 PROCEDURE — 80053 COMPREHEN METABOLIC PANEL: CPT | Performed by: INTERNAL MEDICINE

## 2022-08-02 PROCEDURE — 99214 OFFICE O/P EST MOD 30 MIN: CPT | Mod: 25 | Performed by: INTERNAL MEDICINE

## 2022-08-02 PROCEDURE — 99397 PER PM REEVAL EST PAT 65+ YR: CPT | Performed by: INTERNAL MEDICINE

## 2022-08-02 PROCEDURE — 84443 ASSAY THYROID STIM HORMONE: CPT | Performed by: INTERNAL MEDICINE

## 2022-08-02 PROCEDURE — 86803 HEPATITIS C AB TEST: CPT | Performed by: INTERNAL MEDICINE

## 2022-08-02 PROCEDURE — 70486 CT MAXILLOFACIAL W/O DYE: CPT

## 2022-08-02 RX ORDER — ROSUVASTATIN CALCIUM 20 MG/1
20 TABLET, COATED ORAL DAILY
Qty: 90 TABLET | Refills: 3 | Status: SHIPPED | OUTPATIENT
Start: 2022-08-02 | End: 2023-08-08

## 2022-08-02 ASSESSMENT — ENCOUNTER SYMPTOMS
FREQUENCY: 0
EYE PAIN: 0
NAUSEA: 0
SORE THROAT: 0
PALPITATIONS: 0
DIZZINESS: 0
FEVER: 0
PARESTHESIAS: 0
BREAST MASS: 0
COUGH: 0
ABDOMINAL PAIN: 0
ARTHRALGIAS: 0
NERVOUS/ANXIOUS: 0
HEARTBURN: 0
WEAKNESS: 0
DIARRHEA: 0
CHILLS: 0
MYALGIAS: 0
DYSURIA: 0
HEADACHES: 0
HEMATOCHEZIA: 0
SHORTNESS OF BREATH: 0
HEMATURIA: 0
JOINT SWELLING: 0
CONSTIPATION: 0

## 2022-08-02 ASSESSMENT — PAIN SCALES - GENERAL: PAINLEVEL: NO PAIN (0)

## 2022-08-02 ASSESSMENT — ACTIVITIES OF DAILY LIVING (ADL): CURRENT_FUNCTION: NO ASSISTANCE NEEDED

## 2022-08-02 NOTE — LETTER
August 9, 2022      Romana Felipe  [address] [facility] [address]        Dear ,    We are writing to inform you of your test results.    Routine screening for hepatitis C is negative    Resulted Orders   Hepatitis C Screen Reflex to HCV RNA Quant and Genotype   Result Value Ref Range    Hepatitis C Antibody Nonreactive Nonreactive    Narrative    Assay performance characteristics have not been established for newborns, infants, and children.   Comprehensive metabolic panel (BMP + Alb, Alk Phos, ALT, AST, Total. Bili, TP)   Result Value Ref Range    Sodium 141 133 - 144 mmol/L    Potassium 4.2 3.4 - 5.3 mmol/L    Chloride 108 94 - 109 mmol/L    Carbon Dioxide (CO2) 28 20 - 32 mmol/L    Anion Gap 5 3 - 14 mmol/L    Urea Nitrogen 16 7 - 30 mg/dL    Creatinine 0.67 0.52 - 1.04 mg/dL    Calcium 9.3 8.5 - 10.1 mg/dL    Glucose 89 70 - 99 mg/dL    Alkaline Phosphatase 87 40 - 150 U/L    AST 15 0 - 45 U/L    ALT 36 0 - 50 U/L    Protein Total 7.8 6.8 - 8.8 g/dL    Albumin 4.2 3.4 - 5.0 g/dL    Bilirubin Total 0.5 0.2 - 1.3 mg/dL    GFR Estimate 90 >60 mL/min/1.73m2      Comment:      Effective December 21, 2021 eGFRcr in adults is calculated using the 2021 CKD-EPI creatinine equation which includes age and gender (Aletha langford al., [place], DOI: 10.1056/DVINiw4034224)   TSH with free T4 reflex   Result Value Ref Range    TSH 1.14 0.40 - 4.00 mU/L   Vitamin B12   Result Value Ref Range    Vitamin B12 898 232 - 1,245 pg/mL   CBC with platelets and differential   Result Value Ref Range    WBC Count 5.1 4.0 - 11.0 10e3/uL    RBC Count 4.72 3.80 - 5.20 10e6/uL    Hemoglobin 14.6 11.7 - 15.7 g/dL    Hematocrit 44.6 35.0 - 47.0 %    MCV 95 78 - 100 fL    MCH 30.9 26.5 - 33.0 pg    MCHC 32.7 31.5 - 36.5 g/dL    RDW 12.8 10.0 - 15.0 %    Platelet Count 301 150 - 450 10e3/uL    % Neutrophils 59 %    % Lymphocytes 24 %    % Monocytes 12 %    % Eosinophils 3 %    % Basophils 1 %    Absolute Neutrophils 3.0 1.6 -  8.3 10e3/uL    Absolute Lymphocytes 1.2 0.8 - 5.3 10e3/uL    Absolute Monocytes 0.6 0.0 - 1.3 10e3/uL    Absolute Eosinophils 0.2 0.0 - 0.7 10e3/uL    Absolute Basophils 0.1 0.0 - 0.2 10e3/uL       If you have any questions or concerns, please call the clinic at the number listed above.       Sincerely,      Vicky Mclean, DO

## 2022-08-02 NOTE — PATIENT INSTRUCTIONS
Trigger Finger:  Follow-up with [facility] -surgery may be needed    Foot symptoms:  Blood work  If all normal, monitor; follow-up if symptoms worsen or new symptoms develop such as pain    Fatigue:  Blood work  If all blood work is normal, recommend monitor, continuing self care with good sleep, regular physical exercise, good diet        Patient Education   Personalized Prevention Plan  You are due for the preventive services outlined below.  Your care team is available to assist you in scheduling these services.  If you have already completed any of these items, please share that information with your care team to update in your medical record.  Health Maintenance Due   Topic Date Due    ANNUAL REVIEW OF  ORDERS  Never done    Hepatitis C Screening  Never done       Signs of Hearing Loss      Hearing much better with one ear can be a sign of hearing loss.   Hearing loss is a problem shared by many people. In fact, it is one of the most common health problems, particularly as people age. Most people age 65 and older have some hearing loss. By age 80, almost everyone does. Hearing loss often occurs slowly over the years. So you may not realize your hearing has gotten worse.  Have your hearing checked  Call your healthcare provider if you:  Have to strain to hear normal conversation  Have to watch other people s faces very carefully to follow what they re saying  Need to ask people to repeat what they ve said  Often misunderstand what people are saying  Turn the volume of the television or radio up so high that others complain  Feel that people are mumbling when they re talking to you  Find that the effort to hear leaves you feeling tired and irritated  Notice, when using the phone, that you hear better with one ear than the other  Webs last reviewed this educational content on 1/1/2020 2000-2021 [employer]. All rights reserved. This information is not intended as a substitute for  professional medical care. Always follow your healthcare professional's instructions.

## 2022-08-02 NOTE — PROGRESS NOTES
"SUBJECTIVE:   Romana Felipe is a 77 year old female who presents for Preventive Visit.      Patient has been advised of split billing requirements and indicates understanding: Yes  Are you in the first 12 months of your Medicare coverage?  No    Healthy Habits:     In general, how would you rate your overall health?  Good    Frequency of exercise:  6-7 days/week    Duration of exercise:  15-30 minutes    Do you usually eat at least 4 servings of fruit and vegetables a day, include whole grains    & fiber and avoid regularly eating high fat or \"junk\" foods?  Yes    Taking medications regularly:  Yes    Barriers to taking medications:  None    Medication side effects:  None    Ability to successfully perform activities of daily living:  No assistance needed    Home Safety:  Lack of grab bars in the bathroom    Hearing Impairment:  Difficulty understanding soft or whispered speech    In the past 6 months, have you been bothered by leaking of urine?  No    In general, how would you rate your overall mental or emotional health?  Good      PHQ-2 Total Score: 0    Additional concerns today:  Yes       Chief Complaint   Patient presents with     medicare wellness      Mole on right shoulder had it cut off derm  would to look at scar, Red spot on right shin be there for months and was worse and itchy, scaly, was putting cortisone cream and went down would like to know what this is, right now not itchy, Tired all day long.     Trigger fingers injections did not help would like to know what option are there , feel like pressure on bottom of feet on when shoes are off it don't hurt odd feeling      Lipids     Derm:  --history of basal cell, scar feels lumpy, wonders what else she can do for this    Fatigue:  --feels tired all the time, not sleeping well at night  --spouse  recently  But feels she is coping well w/this; was primary caretaker for ill   for years  --doesn't feel rested;  Coffee in AM helps  --feels " she has to 'push' self out of bed    MSK:  --feels she has stirrup pants on all the time;  No pain;  Feels a pressure or pulling on bottom of feet in the arches, no numbness/tingling    Trigger finger:  --cortisone in Oct which did not help; wonders what next step  --bilateral ring finger    Do you feel safe in your environment? Yes    Have you ever done Advance Care Planning? (For example, a Health Directive, POLST, or a discussion with a medical provider or your loved ones about your wishes): Yes, patient states has an Advance Care Planning document and will bring a copy to the clinic.       Fall risk  Fallen 2 or more times in the past year?: No  Any fall with injury in the past year?: No    Cognitive Screening   1) Repeat 3 items (Leader, Season, Table)    2) Clock draw: NORMAL  3) 3 item recall: Recalls 3 objects  Results: 3 items recalled: COGNITIVE IMPAIRMENT LESS LIKELY    Mini-CogTM Copyright CHARLEY Ford. Licensed by the author for use in [facility]; reprinted with permission (darinel@[email or web address]). All rights reserved.      Do you have sleep apnea, excessive snoring or daytime drowsiness?: no    Reviewed and updated as needed this visit by clinical staff   Tobacco  Allergies  Meds   Med Hx  Surg Hx  Fam Hx  Soc Hx          Reviewed and updated as needed this visit by Provider                   Social History     Tobacco Use     Smoking status: Former Smoker     Years: 2.00     Quit date: 1965     Years since quittin.6     Smokeless tobacco: Former User     Tobacco comment: in college    Substance Use Topics     Alcohol use: Yes     Comment: holidays or special occasions      If you drink alcohol do you typically have >3 drinks per day or >7 drinks per week? No    Alcohol Use 2022   Prescreen: >3 drinks/day or >7 drinks/week? No   Prescreen: >3 drinks/day or >7 drinks/week? -   No flowsheet data found.        Hyperlipidemia Follow-Up      Are you regularly taking any medication or  supplement to lower your cholesterol?   Yes- AM    Are you having muscle aches or other side effects that you think could be caused by your cholesterol lowering medication?  No      Current providers sharing in care for this patient include:   Patient Care Team:  Vicky Mclean DO as PCP - General (Internal Medicine)  Vicky Mclean DO as Assigned PCP  Williams Westbrook MD as Assigned Surgical Provider  Viet Frost MD as MD (Otolaryngology)    The following health maintenance items are reviewed in Epic and correct as of today:  Health Maintenance Due   Topic Date Due     ANNUAL REVIEW OF  ORDERS  Never done     HEPATITIS C SCREENING  Never done     Current Outpatient Medications   Medication Sig Dispense Refill     ibuprofen (ADVIL/MOTRIN) 600 MG tablet Take 600 mg by mouth every 6 hours as needed for moderate pain       melatonin 5 MG tablet Take 5 mg by mouth nightly as needed for sleep       Multiple Vitamins-Minerals (PRESERVISION AREDS 2) CAPS Take 2 capsules by mouth daily One in AM one in PM       rosuvastatin (CRESTOR) 20 MG tablet Take 1 tablet (20 mg) by mouth daily 90 tablet 3     VITAMIN D, CHOLECALCIFEROL, PO Take 5,000 Units by mouth daily             Pertinent mammograms are reviewed under the imaging tab.    Review of Systems   Constitutional: Negative for chills and fever.   HENT: Negative for congestion, ear pain, hearing loss and sore throat.    Eyes: Negative for pain and visual disturbance.   Respiratory: Negative for cough and shortness of breath.    Cardiovascular: Negative for chest pain, palpitations and peripheral edema.   Gastrointestinal: Negative for abdominal pain, constipation, diarrhea, heartburn, hematochezia and nausea.   Breasts:  Negative for tenderness, breast mass and discharge.   Genitourinary: Negative for dysuria, frequency, genital sores, hematuria, pelvic pain, urgency, vaginal bleeding and vaginal discharge.   Musculoskeletal: Negative for  "arthralgias, joint swelling and myalgias.   Skin: Negative for rash.   Neurological: Negative for dizziness, weakness, headaches and paresthesias.   Psychiatric/Behavioral: Negative for mood changes. The patient is not nervous/anxious.          OBJECTIVE:   /78 (BP Location: Right arm, Patient Position: Standing, Cuff Size: Adult Regular)   Pulse 80   Temp 98.4  F (36.9  C) (Tympanic)   Ht 1.588 m (5' 2.5\")   Wt 63.9 kg (140 lb 12.8 oz)   SpO2 96%   BMI 25.34 kg/m   Estimated body mass index is 25.34 kg/m  as calculated from the following:    Height as of this encounter: 1.588 m (5' 2.5\").    Weight as of this encounter: 63.9 kg (140 lb 12.8 oz).  Physical Exam  GENERAL: healthy, alert and no distress  EYES: Eyes grossly normal to inspection, PERRL and conjunctivae and sclerae normal  HENT: ear canals and TM's normal, nose and mouth without ulcers or lesions  NECK: no adenopathy, no asymmetry, masses, or scars and thyroid normal to palpation  RESP: lungs clear to auscultation - no rales, rhonchi or wheezes  CV: regular rate and rhythm, normal S1 S2, no S3 or S4, no murmur, click or rub, no peripheral edema and peripheral pulses strong  ABDOMEN: soft, nontender, no hepatosplenomegaly, no masses and bowel sounds normal  MS: no gross musculoskeletal defects noted, no edema  SKIN: no suspicious lesions or rashes  NEURO: Normal strength and tone, sensory exam grossly normal, light touch and pinprick normal, mentation intact and normal monofilament testing bilateral feet, normal vibratory sensation bilateral feet  PSYCH: mentation appears normal, affect normal/bright    Diagnostic Test Results:  Labs reviewed in Epic    ASSESSMENT / PLAN:   (Z00.00) Encounter for Medicare annual wellness exam  (primary encounter diagnosis)  Comment:   Plan:     (E78.5) Hyperlipidemia LDL goal <100  Comment:  - stable, refill provided  Plan: rosuvastatin (CRESTOR) 20 MG tablet, Lipid         panel reflex to direct LDL " "Non-fasting            (M65.30) Trigger finger, acquired  Comment: see ortho surgery  Plan:     (R53.82) Chronic fatigue  Comment: stress vs depression vs anemia vs other  Plan: CBC with platelets and differential,         Comprehensive metabolic panel (BMP + Alb, Alk         Phos, ALT, AST, Total. Bili, TP), TSH with free        T4 reflex, Vitamin B12            (M85.89) Osteopenia of multiple sites  Comment: due for follow-up   Plan: DX Hip/Pelvis/Spine            (R20.9) Abnormal sensation of lower extremity  Comment: symptoms are not consistent with neuropathy - normal neuro exam.  Labs as ordered, if all normal, monitor  Plan:     (Z11.59) Need for hepatitis C screening test  Comment:   Plan: Hepatitis C Screen Reflex to HCV RNA Quant and         Genotype              Patient has been advised of split billing requirements and indicates understanding: Yes    COUNSELING:  Reviewed preventive health counseling, as reflected in patient instructions    Estimated body mass index is 25.34 kg/m  as calculated from the following:    Height as of this encounter: 1.588 m (5' 2.5\").    Weight as of this encounter: 63.9 kg (140 lb 12.8 oz).         She reports that she quit smoking about 57 years ago. She quit after 2.00 years of use. She has quit using smokeless tobacco.      Appropriate preventive services were discussed with this patient, including applicable screening as appropriate for cardiovascular disease, diabetes, osteopenia/osteoporosis, and glaucoma.  As appropriate for age/gender, discussed screening for colorectal cancer, prostate cancer, breast cancer, and cervical cancer. Checklist reviewing preventive services available has been given to the patient.    Reviewed patients plan of care and provided an AVS. The Complex Care Plan (for patients with higher acuity and needing more deliberate coordination of services) for Romana meets the Care Plan requirement. This Care Plan has been established and reviewed with " the Patient.    Counseling Resources:  ATP IV Guidelines  Pooled Cohorts Equation Calculator  Breast Cancer Risk Calculator  Breast Cancer: Medication to Reduce Risk  FRAX Risk Assessment  ICSI Preventive Guidelines  Dietary Guidelines for Americans, 2010  USDA's MyPlate  ASA Prophylaxis  Lung CA Screening    Vicky Mclean DO  [facility]    Identified Health Risks:    The patient was provided with written information regarding signs of hearing loss.

## 2022-08-03 LAB
HCV AB SERPL QL IA: NONREACTIVE
VIT B12 SERPL-MCNC: 898 PG/ML (ref 232–1245)

## 2022-08-03 NOTE — RESULT ENCOUNTER NOTE
B12, thyroid, kidney and liver function, electrolytes, blood sugar, blood counts are all normal.  Cholesterol is slightly higher than last check 1 year ago but remains well controlled and improved compared to 2 years ago.  Continue plan of care discussed at time of visit.

## 2022-08-09 ENCOUNTER — TELEPHONE (OUTPATIENT)
Dept: OTOLARYNGOLOGY | Facility: CLINIC | Age: 77
End: 2022-08-09

## 2022-08-09 DIAGNOSIS — R05.3 CHRONIC COUGH: Primary | ICD-10-CM

## 2022-08-09 DIAGNOSIS — R09.82 POST-NASAL DRAINAGE: ICD-10-CM

## 2022-08-09 NOTE — TELEPHONE ENCOUNTER
I called and spoke with Romana Bee.  She had the CT done to make sure there was no other issues with the sinuses, her post nasal drainage, and the throat clearing.    We talked about the option of trying different medications, and she would like to think about it.    She will call of MyChart email me if should would like to try something.  The first thing I would use would be ipratropium bromide nasal spray

## 2022-09-15 ENCOUNTER — TELEPHONE (OUTPATIENT)
Dept: OTOLARYNGOLOGY | Facility: CLINIC | Age: 77
End: 2022-09-15

## 2022-09-15 DIAGNOSIS — H60.8X3 CHRONIC ECZEMATOUS OTITIS EXTERNA OF BOTH EARS: Primary | ICD-10-CM

## 2022-09-15 RX ORDER — PREDNISOLONE ACETATE 10 MG/ML
SUSPENSION/ DROPS OPHTHALMIC
Qty: 10 ML | Refills: 11 | Status: SHIPPED | OUTPATIENT
Start: 2022-09-15 | End: 2023-08-08

## 2022-09-15 NOTE — TELEPHONE ENCOUNTER
Reason for Call:  Other call back    Detailed comments: Pt states she went to renew eardrops (fluocinolone acetonide 0.01 % OIL -Discontinued) prescribed 07/22/22 and states they were 76.00 dollars at [facility]. Pt looking for alternative medication-cheaper.     Phone Number Patient can be reached at: Home number on file [phone] (home)    Best Time: any    Can we leave a detailed message on this number? YES    Call taken on 9/15/2022 at 11:26 AM by Vidya Li

## 2022-10-24 ENCOUNTER — HOSPITAL ENCOUNTER (OUTPATIENT)
Dept: BONE DENSITY | Facility: HOSPITAL | Age: 77
Discharge: HOME | End: 2022-10-24
Attending: INTERNAL MEDICINE | Admitting: INTERNAL MEDICINE
Payer: COMMERCIAL

## 2022-10-24 DIAGNOSIS — M85.89 OSTEOPENIA OF MULTIPLE SITES: ICD-10-CM

## 2022-10-24 PROCEDURE — 77080 DXA BONE DENSITY AXIAL: CPT

## 2022-10-24 PROCEDURE — 77081 DXA BONE DENSITY APPENDICULR: CPT | Mod: XS

## 2022-10-28 NOTE — RESULT ENCOUNTER NOTE
There is evidence of osteoporosis in the bone density test.  Recommend a virtual or face-to-face visit for discussion of treatment

## 2022-11-02 ENCOUNTER — VIRTUAL VISIT (OUTPATIENT)
Dept: FAMILY MEDICINE | Facility: CLINIC | Age: 77
End: 2022-11-02
Payer: COMMERCIAL

## 2022-11-02 DIAGNOSIS — M81.0 AGE-RELATED OSTEOPOROSIS WITHOUT CURRENT PATHOLOGICAL FRACTURE: Primary | ICD-10-CM

## 2022-11-02 PROCEDURE — 99214 OFFICE O/P EST MOD 30 MIN: CPT | Mod: 95 | Performed by: INTERNAL MEDICINE

## 2022-11-02 RX ORDER — ALENDRONATE SODIUM 70 MG/1
70 TABLET ORAL
Qty: 12 TABLET | Refills: 4 | Status: SHIPPED | OUTPATIENT
Start: 2022-11-02 | End: 2023-08-08

## 2022-11-02 NOTE — PATIENT INSTRUCTIONS
Start Fosamax  Inform your chiro of osteoporosis diagnosis - avoid much of the 'cracking or snapping' due to osteoporosis   Continue Vitamin D and high calcium diet  Recheck in 2 years      You should start therapy for osteoporosis with a bisphosphonate (Actonel, Fosamax or Reclast).    --Your risk of hip or spine fracture is elevated.  We can calculate your individual risk.  --The most common side effect of bisphosphonates is stomach irritation (less than 7%). Many people with history of GERD or ulcers can tolerate these medications fine.  There are specific instructions on how to take the medication and this reduces the chance of GI irritation.  --Another common side effects is muscle/bone pain (less than 6%).    Very rare side effects of bisphosphonates include osteonecrosis of the jaw and atypical thigh (Femur) fractures.  --your risk of osteonecrosis of the jaw is 1 in 10,000.  --your risk of atypical femur fracture is 1 in 100,000  --your lifetime risk of getting struck by lightning is 1 in 15,000

## 2022-11-02 NOTE — PROGRESS NOTES
Romana Bee is a 77 year old who is being evaluated via a billable telephone visit.      What phone number would you like to be contacted at? [phone]  How would you like to obtain your AVS? Vidal would also like this mailed     Assessment & Plan   Problem List Items Addressed This Visit     Age-related osteoporosis without current pathological fracture - Primary    Relevant Medications    alendronate (FOSAMAX) 70 MG tablet   Reviewed pathophysiology of osteoporosis along with the asymptomatic nature, risk of fracture.  Discussed medication at length including potential side effects.  She is agreeable to starting medication.  Advise she discuss this with her chiropractor and recommended she avoid HVLA type techniques with the chiropractor.  Recheck bone density in 2 years.                 Patient Instructions   1. Start Fosamax  2. Inform your chiro of osteoporosis diagnosis - avoid much of the 'cracking or snapping' due to osteoporosis   3. Continue Vitamin D and high calcium diet  4. Recheck in 2 years      You should start therapy for osteoporosis with a bisphosphonate (Actonel, Fosamax or Reclast).    --Your risk of hip or spine fracture is elevated.  We can calculate your individual risk.  --The most common side effect of bisphosphonates is stomach irritation (less than 7%). Many people with history of GERD or ulcers can tolerate these medications fine.  There are specific instructions on how to take the medication and this reduces the chance of GI irritation.  --Another common side effects is muscle/bone pain (less than 6%).    Very rare side effects of bisphosphonates include osteonecrosis of the jaw and atypical thigh (Femur) fractures.  --your risk of osteonecrosis of the jaw is 1 in 10,000.  --your risk of atypical femur fracture is 1 in 100,000  --your lifetime risk of getting struck by lightning is 1 in 15,000          No follow-ups on file.    Vicky Mclean, [facility]  "[place]    Subjective   Romana Bee is a 77 year old accompanied by her self, presenting for the following health issues:  Follow Up (From DEXA scan done on 10- 24-22/Would like to know what nexts steps are and where it is all located in the body//Per scan said//\"There is evidence of osteoporosis in the bone density test.  Recommend a virtual or face-to-face visit for discussion of treatment\" /Is going to PT for walking and posture in [place] with PDR doing X machine still have a couple more times that has to go, this is helping. )      HPI     Chief Complaint   Patient presents with     Follow Up     From DEXA scan done on 10- 24-22  Would like to know what nexts steps are and where it is all located in the body    Per scan said    \"There is evidence of osteoporosis in the bone density test.  Recommend a virtual or face-to-face visit for discussion of treatment\"   Is going to PT for walking and posture in [place] with PDR doing X machine still have a couple more times that has to go, this is helping.        Osteoporosis:  --since at least 2015;  No prior treatment of osteoporosis   --no major planned dental work  --she already takes ca/D - consumes plenty of calcium  --seeing chiro and physical therapy for neck/back pain and scoliosis.  Wonders if these are safe  --she wonders why she was not treated for osteoporosis  In the past when it was diagnosed in the past     Current Outpatient Medications   Medication Sig Dispense Refill     ibuprofen (ADVIL/MOTRIN) 600 MG tablet Take 600 mg by mouth every 6 hours as needed for moderate pain       melatonin 5 MG tablet Take 5 mg by mouth nightly as needed for sleep       Multiple Vitamins-Minerals (PRESERVISION AREDS 2) CAPS Take 2 capsules by mouth daily One in AM one in PM       prednisoLONE acetate (PRED FORTE) 1 % ophthalmic suspension 5 drops each EAR every other day 10 mL 11     rosuvastatin (CRESTOR) 20 MG tablet Take 1 tablet (20 mg) by mouth daily 90 tablet 3 "     VITAMIN D, CHOLECALCIFEROL, PO Take 5,000 Units by mouth daily           Review of Systems   Constitutional, HEENT, cardiovascular, pulmonary, gi and gu systems are negative, except as otherwise noted.      Objective           Vitals:  No vitals were obtained today due to virtual visit.    Physical Exam   healthy, alert and no distress  PSYCH: Alert and oriented times 3; coherent speech, normal   rate and volume, able to articulate logical thoughts, able   to abstract reason, no tangential thoughts, no hallucinations   or delusions  Her affect is normal  RESP: No cough, no audible wheezing, able to talk in full sentences  Remainder of exam unable to be completed due to telephone visits                Phone call duration: 26 minutes

## 2022-11-09 ENCOUNTER — TELEPHONE (OUTPATIENT)
Dept: FAMILY MEDICINE | Facility: CLINIC | Age: 77
End: 2022-11-09

## 2022-11-09 NOTE — TELEPHONE ENCOUNTER
"Pt called saying the chiropractor told pt the fosamax will interfere with to breaking down old bone cells, he says it \"paralyzes\" the ability the get rid of these cells and imaging will show the the bone are more dense than they really are. Message forwarded to provider to clarify for pt.  Cheryl Turpin RN     "

## 2022-11-09 NOTE — TELEPHONE ENCOUNTER
Medication Question or Refill        What medication are you calling about (include dose and sig)?: Fosamax     Controlled Substance Agreement on file:   CSA -- Patient Level:    CSA: None found at the patient level.       Who prescribed the medication?: Vinay    Do you need a refill? No    Do you have any questions or concerns?  Yes: Her chiropractor said that Fosamax is not the best medication for this. Please call patient.    Preferred Pharmacy:   [facility] [address] [place] [address] [address] [place] [address] [address] [address]  Phone: [phone] Fax: [phone]      Could we send this information to you in [employer] or would you prefer to receive a phone call?:   Patient would prefer a phone call   Okay to leave a detailed message?: Yes at Home number on file [phone] (home)

## 2023-02-17 ENCOUNTER — OFFICE (OUTPATIENT)
Dept: URBAN - METROPOLITAN AREA CLINIC 9 | Facility: CLINIC | Age: 78
End: 2023-02-17
Payer: MEDICAID

## 2023-02-17 DIAGNOSIS — E11.9 TYPE 2 DIABETES MELLITUS WITHOUT COMPLICATIONS: ICD-10-CM

## 2023-02-17 DIAGNOSIS — R68.81 EARLY SATIETY: ICD-10-CM

## 2023-02-17 DIAGNOSIS — R11.2 NAUSEA WITH VOMITING, UNSPECIFIED: ICD-10-CM

## 2023-02-17 DIAGNOSIS — I25.10 ATHEROSCLEROTIC HEART DISEASE OF NATIVE CORONARY ARTERY WITH: ICD-10-CM

## 2023-02-17 DIAGNOSIS — K31.7 POLYP OF STOMACH AND DUODENUM: ICD-10-CM

## 2023-02-17 PROCEDURE — 99204 OFFICE O/P NEW MOD 45 MIN: CPT | Performed by: SPECIALIST

## 2023-03-22 VITALS
OXYGEN SATURATION: 93 % | SYSTOLIC BLOOD PRESSURE: 185 MMHG | HEIGHT: 63 IN | DIASTOLIC BLOOD PRESSURE: 58 MMHG | HEART RATE: 57 BPM | SYSTOLIC BLOOD PRESSURE: 186 MMHG

## 2023-04-14 ENCOUNTER — OFFICE (OUTPATIENT)
Dept: URBAN - METROPOLITAN AREA CLINIC 9 | Facility: CLINIC | Age: 78
End: 2023-04-14
Payer: MEDICAID

## 2023-04-14 VITALS
HEIGHT: 63 IN | HEART RATE: 61 BPM | SYSTOLIC BLOOD PRESSURE: 179 MMHG | DIASTOLIC BLOOD PRESSURE: 69 MMHG | WEIGHT: 156 LBS | OXYGEN SATURATION: 99 %

## 2023-04-14 DIAGNOSIS — K31.84 GASTROPARESIS: ICD-10-CM

## 2023-04-14 DIAGNOSIS — R11.2 NAUSEA WITH VOMITING, UNSPECIFIED: ICD-10-CM

## 2023-04-14 DIAGNOSIS — E11.9 TYPE 2 DIABETES MELLITUS WITHOUT COMPLICATIONS: ICD-10-CM

## 2023-04-14 DIAGNOSIS — R12 HEARTBURN: ICD-10-CM

## 2023-04-14 PROCEDURE — 99213 OFFICE O/P EST LOW 20 MIN: CPT | Performed by: SPECIALIST

## 2023-07-05 ENCOUNTER — TELEPHONE (OUTPATIENT)
Dept: FAMILY MEDICINE | Facility: CLINIC | Age: 78
End: 2023-07-05
Payer: COMMERCIAL

## 2023-07-05 NOTE — TELEPHONE ENCOUNTER
Musculoskeletal pain from Fosamax is not very common side effect, but since symptoms started after patient started treatment with Fosamax, I recommend to stop medication and follow up with PCP to discuss her symptoms and possible alternative treatment for osteoporosis.    QUIANA Griffin CNP, covering for PCP

## 2023-07-05 NOTE — TELEPHONE ENCOUNTER
Provider covering for Dr. Mclean,    The patient has not started any new medications but Fosamax. She has taken this medication since January.  Every Thursday once a week since.  The muscle pain is all over.  Her back and the back sides of her arms.  Her waist and her right hip area also.  Not in her legs.  No fever.  Has 1 episode of urinary retention where she got up during the night and could not urinate.  Can urinate now. No blood noted.  No further problems with that.  Has tried IB for the muscle pain.

## 2023-07-05 NOTE — TELEPHONE ENCOUNTER
Symptoms    Describe your symptoms: patient called stating that she started fosamax since january , since late february she has lumbar/hip pain, and upper arm pain. Feels like a strain muscle pain.     Patient also experience bladder fullness and unable to void--2 nights ago. No sx since. Denies burning or voiding pain.     Any pain: No    How long have you been having symptoms: body pains since 2 months     Has appt in   Next 5 appointments (look out 90 days)    Aug 08, 2023  9:30 AM  (Arrive by 9:10 AM)  Annual Wellness Visit with Vicky Mclean DO  [facility] ([facility] [place] )  Arrive at: [facility] [address] [facility] [address] [phone]           Preferred Pharmacy:   [facility] [address] [facility] [address] [facility] [address]  Phone: [phone] Fax: [phone]      Could we send this information to you in [facility] or would you prefer to receive a phone call?:   Patient would prefer a phone call   Okay to leave a detailed message?: Yes at Home number on file [phone] (home)

## 2023-08-04 ENCOUNTER — OFFICE (OUTPATIENT)
Dept: URBAN - METROPOLITAN AREA CLINIC 9 | Facility: CLINIC | Age: 78
End: 2023-08-04
Payer: MEDICAID

## 2023-08-04 VITALS
WEIGHT: 158 LBS | SYSTOLIC BLOOD PRESSURE: 143 MMHG | OXYGEN SATURATION: 97 % | HEART RATE: 64 BPM | DIASTOLIC BLOOD PRESSURE: 53 MMHG | HEIGHT: 63 IN

## 2023-08-04 DIAGNOSIS — K31.7 POLYP OF STOMACH AND DUODENUM: ICD-10-CM

## 2023-08-04 DIAGNOSIS — I25.10 ATHEROSCLEROTIC HEART DISEASE OF NATIVE CORONARY ARTERY WITH: ICD-10-CM

## 2023-08-04 DIAGNOSIS — K31.84 GASTROPARESIS: ICD-10-CM

## 2023-08-04 DIAGNOSIS — Z86.010 PERSONAL HISTORY OF COLONIC POLYPS: ICD-10-CM

## 2023-08-04 DIAGNOSIS — R12 HEARTBURN: ICD-10-CM

## 2023-08-04 PROCEDURE — 99212 OFFICE O/P EST SF 10 MIN: CPT | Performed by: SPECIALIST

## 2023-08-04 RX ORDER — PANTOPRAZOLE 40 MG/1
40 TABLET, DELAYED RELEASE ORAL
Qty: 90 | Refills: 3 | Status: ACTIVE

## 2023-08-04 RX ORDER — METOCLOPRAMIDE HYDROCHLORIDE 5 MG/1
TABLET ORAL
Qty: 270 | Refills: 3 | Status: ACTIVE
Start: 2023-03-22

## 2023-08-08 ENCOUNTER — OFFICE VISIT (OUTPATIENT)
Dept: FAMILY MEDICINE | Facility: CLINIC | Age: 78
End: 2023-08-08
Payer: COMMERCIAL

## 2023-08-08 VITALS
DIASTOLIC BLOOD PRESSURE: 84 MMHG | SYSTOLIC BLOOD PRESSURE: 122 MMHG | BODY MASS INDEX: 23.76 KG/M2 | OXYGEN SATURATION: 98 % | WEIGHT: 139.2 LBS | RESPIRATION RATE: 16 BRPM | HEART RATE: 69 BPM | TEMPERATURE: 97.2 F | HEIGHT: 64 IN

## 2023-08-08 DIAGNOSIS — M41.9 SCOLIOSIS, UNSPECIFIED SCOLIOSIS TYPE, UNSPECIFIED SPINAL REGION: ICD-10-CM

## 2023-08-08 DIAGNOSIS — E78.5 HYPERLIPIDEMIA LDL GOAL <100: ICD-10-CM

## 2023-08-08 DIAGNOSIS — Z00.00 ENCOUNTER FOR MEDICARE ANNUAL WELLNESS EXAM: Primary | ICD-10-CM

## 2023-08-08 DIAGNOSIS — M65.30 TRIGGER FINGER, ACQUIRED: ICD-10-CM

## 2023-08-08 DIAGNOSIS — M81.0 AGE-RELATED OSTEOPOROSIS WITHOUT CURRENT PATHOLOGICAL FRACTURE: ICD-10-CM

## 2023-08-08 DIAGNOSIS — Z85.828 HISTORY OF BASAL CELL CARCINOMA: ICD-10-CM

## 2023-08-08 LAB
CHOLEST SERPL-MCNC: 166 MG/DL
HDLC SERPL-MCNC: 72 MG/DL
LDLC SERPL CALC-MCNC: 64 MG/DL
NONHDLC SERPL-MCNC: 94 MG/DL
TRIGL SERPL-MCNC: 152 MG/DL

## 2023-08-08 PROCEDURE — 36415 COLL VENOUS BLD VENIPUNCTURE: CPT | Performed by: INTERNAL MEDICINE

## 2023-08-08 PROCEDURE — 80061 LIPID PANEL: CPT | Performed by: INTERNAL MEDICINE

## 2023-08-08 PROCEDURE — G0439 PPPS, SUBSEQ VISIT: HCPCS | Performed by: INTERNAL MEDICINE

## 2023-08-08 PROCEDURE — 99214 OFFICE O/P EST MOD 30 MIN: CPT | Mod: 25 | Performed by: INTERNAL MEDICINE

## 2023-08-08 RX ORDER — ROSUVASTATIN CALCIUM 20 MG/1
20 TABLET, COATED ORAL DAILY
Qty: 90 TABLET | Refills: 3 | Status: SHIPPED | OUTPATIENT
Start: 2023-08-08 | End: 2024-08-13

## 2023-08-08 ASSESSMENT — ENCOUNTER SYMPTOMS
HEADACHES: 0
CHILLS: 0
PARESTHESIAS: 0
MYALGIAS: 0
SORE THROAT: 0
CONSTIPATION: 0
NERVOUS/ANXIOUS: 0
HEMATURIA: 0
HEMATOCHEZIA: 0
BREAST MASS: 0
PALPITATIONS: 0
FREQUENCY: 0
HEARTBURN: 0
DIARRHEA: 0
ABDOMINAL PAIN: 0
EYE PAIN: 0
JOINT SWELLING: 0
FEVER: 0
DYSURIA: 0
ARTHRALGIAS: 0
WEAKNESS: 0
DIZZINESS: 0
COUGH: 0
NAUSEA: 0
SHORTNESS OF BREATH: 0

## 2023-08-08 ASSESSMENT — ACTIVITIES OF DAILY LIVING (ADL): CURRENT_FUNCTION: NO ASSISTANCE NEEDED

## 2023-08-08 ASSESSMENT — PAIN SCALES - GENERAL: PAINLEVEL: NO PAIN (0)

## 2023-08-08 NOTE — PATIENT INSTRUCTIONS
"  Scoliosis  Continue with home exercises     Fatigue  We performed extensive blood work which do that was all normal  Review tips for sleep hygiene as below  Many times the fatigue is due to \"lifestyle\" issues of inadequate hydration, poor nutrition, excessive caffeine, stress, poor sleep habits      Sleep Hygiene    1. Avoid doing anything stressful in the hour before bedtime. Avoid paying bills, watching politics on the news, etc.  2. Avoid screens just before bedtime. This includes cell phones, iPad, computers, TV. The bright lights on the screen is very stimulating to the brain, and makes it difficult to follow asleep and stay asleep  3. Avoid alcohol. Alcohol can sometimes make it easier to fall asleep, but significantly reduces the chance that you will stay asleep. It also impairs REM sleep, which is the good restful sleep  4. Use the bed for sleep and sex only. Avoid eating, reading, watching TV in bed  5. If you feel very restless at bedtime, try doing mundane physical activities such as vacuuming, folding laundry, etc.  6. If you find yourself making lists in your head at night, keep pen and paper at the bedside. Write down these lists. Also visualize yourself checking that item off your list and removing it from your brain.  7. Keep the room cool and dark. Consider investing in late darkening curtains  8. Avoid drinking excessive fluids just before bedtime. Empty your bladder just before bedtime  9. Cognitive behavioral therapy (CBT) has been proven to be highly effective to treat insomnia.  There is also a free glendy called CBT-I   10. Meditation can help sleep.  Calm, Headspace, Peloton, Insight Timer, Relax Melodies  11. Books - \"Mind Over Mood\".  \"Say Nomi to Insomnia\"              Patient Education   Personalized Prevention Plan  You are due for the preventive services outlined below.  Your care team is available to assist you in scheduling these services.  If you have already completed any " "of these items, please share that information with your care team to update in your medical record.  Health Maintenance Due   Topic Date Due    COVID-19 Vaccine (6 - Pfizer series) 02/11/2023    ANNUAL REVIEW OF HM ORDERS  08/02/2023     Learning About Being Physically Active  What is physical activity?     Being physically active means doing any kind of activity that gets your body moving.  The types of physical activity that can help you get fit and stay healthy include:  Aerobic or \"cardio\" activities. These make your heart beat faster and make you breathe harder, such as brisk walking, riding a bike, or running. They strengthen your heart and lungs and build up your endurance.  Strength training activities. These make your muscles work against, or \"resist,\" something. Examples include lifting weights or doing push-ups. These activities help tone and strengthen your muscles and bones.  Stretches. These let you move your joints and muscles through their full range of motion. Stretching helps you be more flexible.  Reaching a balance between these three types of physical activity is important because each one contributes to your overall fitness.  What are the benefits of being active?  Being active is one of the best things you can do for your health. It helps you to:  Feel stronger and have more energy to do all the things you like to do.  Focus better at school or work.  Feel, think, and sleep better.  Reach and stay at a healthy weight.  Lose fat and build lean muscle.  Lower your risk for serious health problems, including diabetes, heart attack, high blood pressure, and some cancers.  Keep your heart, lungs, bones, muscles, and joints strong and healthy.  How can you make being active part of your life?  Start slowly. Make it your long-term goal to get at least 30 minutes of exercise on most days of the week. Walking is a good choice. You also may want to do other activities, such as running, swimming, " "cycling, or playing tennis or team sports.  Pick activities that you like--ones that make your heart beat faster, your muscles stronger, and your muscles and joints more flexible. If you find more than one thing you like doing, do them all. You don't have to do the same thing every day.  Get your heart pumping every day. Any activity that makes your heart beat faster and keeps it at that rate for a while counts.  Here are some great ways to get your heart beating faster:  Go for a brisk walk, run, or bike ride.  Go for a hike or swim.  Go in-line skating.  Play a game of touch football, basketball, or soccer.  Ride a bike.  Play tennis or racquetball.  Climb stairs.  Even some household chores can be aerobic--just do them at a faster pace. Vacuuming, raking or mowing the lawn, sweeping the garage, and washing and waxing the car all can help get your heart rate up.  Strengthen your muscles during the week. You don't have to lift heavy weights or grow big, bulky muscles to get stronger. Doing a few simple activities that make your muscles work against, or \"resist,\" something can help you get stronger.  For example, you can:  Do push-ups or sit-ups, which use your own body weight as resistance.  Lift weights or dumbbells or use stretch bands at home or in a gym or community center.  Stretch your muscles often. Stretching will help you as you become more active. It can help you stay flexible, loosen tight muscles, and avoid injury. It can also help improve your balance and posture and can be a great way to relax.  Be sure to stretch the muscles you'll be using when you work out. It's best to warm your muscles slightly before you stretch them. Walk or do some other light aerobic activity for a few minutes, and then start stretching.  When you stretch your muscles:  Do it slowly. Stretching is not about going fast or making sudden movements.  Don't push or bounce during a stretch.  Hold each stretch for at least 15 to 30 " "seconds, if you can. You should feel a stretch in the muscle, but not pain.  Breathe out as you do the stretch. Then breathe in as you hold the stretch. Don't hold your breath.  If you're worried about how more activity might affect your health, have a checkup before you start. Follow any special advice your doctor gives you for getting a smart start.  Where can you learn more?  Go to https://www.[employer].net/patiented  Enter W332 in the search box to learn more about \"Learning About Being Physically Active.\"  Current as of: October 10, 2022               Content Version: 13.7    [address]365[address] [employer].   Care instructions adapted under license by your healthcare professional. If you have questions about a medical condition or this instruction, always ask your healthcare professional. [employer] disclaims any warranty or liability for your use of this information.         "

## 2023-08-08 NOTE — PROGRESS NOTES
"SUBJECTIVE:   Romana Bee is a 78 year old who presents for Preventive Visit.      8/8/2023     6:40 AM   Additional Questions   Roomed by adonay carlin   Accompanied by self         8/8/2023     6:40 AM   Patient Reported Additional Medications   Patient reports taking the following new medications none       Are you in the first 12 months of your Medicare coverage?  No    Healthy Habits:     In general, how would you rate your overall health?  Good    Frequency of exercise:  None    Do you usually eat at least 4 servings of fruit and vegetables a day, include whole grains    & fiber and avoid regularly eating high fat or \"junk\" foods?  Yes    Taking medications regularly:  Yes    Medication side effects:  Not applicable    Ability to successfully perform activities of daily living:  No assistance needed    Home Safety:  No safety concerns identified    Hearing Impairment:  No hearing concerns    In the past 6 months, have you been bothered by leaking of urine?  No    In general, how would you rate your overall mental or emotional health?  Good    Additional concerns today:  Yes      Chief Complaint   Patient presents with    medicare wellness      Look at wax in ear, back posture, triager finger , tired all this time get about 8 hrs a sleep  will take a nap during the day when really tired .     Lipids    Health Maintenance     Due for covid           Have you ever done Advance Care Planning? (For example, a Health Directive, POLST, or a discussion with a medical provider or your loved ones about your wishes): Yes, advance care planning is on file.       Fall risk  Fallen 2 or more times in the past year?: No  Any fall with injury in the past year?: No  click delete button to remove this line now  Cognitive Screening   1) Repeat 3 items (Leader, Season, Table)    2) Clock draw: NORMAL  3) 3 item recall: Recalls 3 objects  Results: 3 items recalled: COGNITIVE IMPAIRMENT LESS LIKELY    Mini-CogTM Copyright CHARLEY Ford. " Licensed by the author for use in [facility]; reprinted with permission (darinel@[facility]). All rights reserved.      Do you have sleep apnea, excessive snoring or daytime drowsiness? : no    Reviewed and updated as needed this visit by clinical staff   Tobacco  Allergies  Meds              Reviewed and updated as needed this visit by Provider     Meds             Social History     Tobacco Use    Smoking status: Former     Years: 2.00     Types: Cigarettes     Quit date: 1965     Years since quittin.6    Smokeless tobacco: Former    Tobacco comments:     in college    Substance Use Topics    Alcohol use: Yes     Comment: holidays or special occasions              2023     9:03 AM   Alcohol Use   Prescreen: >3 drinks/day or >7 drinks/week? No       Do you have a current opioid prescription? No  Do you use any other controlled substances or medications that are not prescribed by a provider? None      Concern - spots on back  Onset: years  Description:   Intensity: moderate  Progression of Symptoms:  worried about skin cancer  Accompanying Signs & Symptoms:  Have had some cut off before  Previous history of similar problem:   Have had this for years  Precipitating factors:   Worsened by: none  Alleviating factors:Improved by: none  Therapies Tried and outcome: removal in the past    Hyperlipidemia Follow-Up    Are you regularly taking any medication or supplement to lower your cholesterol?   Yes- PM  Are you having muscle aches or other side effects that you think could be caused by your cholesterol lowering medication?  No    Osteoporosis   --self stopped fosamax t hat we started in Nov  --she goes to Chiro regularly  --took it starting in  and thought she had side effects -bilateral arm and leg pain;  continued to take med up through July and she stopped med;  going off med resolved the myalgias    Fatigue:  --we discussed last year as well - CBC, CMP, TSH, B12 were all normal  --feels  tired all the time, not sleeping well at night  --spouse  1.5 yr ago But feels she is coping well w/this; was primary caretaker for ill   for years  --doesn't feel rested;  Coffee in AM helps  --feels she has to 'push' self out of bed  --eating fresh fruit/veggie most days; has a veggie garden;  doesn't eat a lot of processed foods, and smaller portions  --caffeine only in the AM    Ear wax - wants checked    Scoliosis  --wonders what else she can do to help her posture  --she has done 22 sessions of physical therapy in [place] and this has helped  --she has been doing exercises faithfully for 1 year and feels it has helped  --saw spine surgeon years ago and was told it was a 12 hr surgical repair    trigger finger  --wonders if she needs surgery    Current providers sharing in care for this patient include:   Patient Care Team:  Vicky Mclean DO as PCP - General (Internal Medicine)  Vicky Mclean DO as Assigned PCP  Viet Frost MD as MD (Otolaryngology)  Viet Frost MD as Assigned Surgical Provider    The following health maintenance items are reviewed in Epic and correct as of today:  Health Maintenance   Topic Date Due    COVID-19 Vaccine (6 - Pfizer series) 2023    ANNUAL REVIEW OF HM ORDERS  2023    INFLUENZA VACCINE (1) 2023    MEDICARE ANNUAL WELLNESS VISIT  2024    FALL RISK ASSESSMENT  2024    DEXA  10/26/2024    LIPID  2027    ADVANCE CARE PLANNING  2028    DTAP/TDAP/TD IMMUNIZATION (6 - Td or Tdap) 2029    HEPATITIS C SCREENING  Completed    PHQ-2 (once per calendar year)  Completed    Pneumococcal Vaccine: 65+ Years  Completed    ZOSTER IMMUNIZATION  Completed    IPV IMMUNIZATION  Aged Out    MENINGITIS IMMUNIZATION  Aged Out    MAMMO SCREENING  Discontinued    COLORECTAL CANCER SCREENING  Discontinued     Current Outpatient Medications   Medication Sig Dispense Refill    ibuprofen (ADVIL/MOTRIN) 600 MG  "tablet Take 600 mg by mouth every 6 hours as needed for moderate pain      melatonin 5 MG tablet Take 5 mg by mouth nightly as needed for sleep      Multiple Vitamins-Minerals (PRESERVISION AREDS 2) CAPS Take 2 capsules by mouth daily One in AM one in PM      rosuvastatin (CRESTOR) 20 MG tablet Take 1 tablet (20 mg) by mouth daily 90 tablet 3    VITAMIN D, CHOLECALCIFEROL, PO Take 2,000 Units by mouth daily D3         Pertinent mammograms are reviewed under the imaging tab.    Review of Systems   Constitutional:  Negative for chills and fever.   HENT:  Negative for congestion, ear pain, hearing loss and sore throat.    Eyes:  Negative for pain and visual disturbance.   Respiratory:  Negative for cough and shortness of breath.    Cardiovascular:  Negative for chest pain, palpitations and peripheral edema.   Gastrointestinal:  Negative for abdominal pain, constipation, diarrhea, heartburn, hematochezia and nausea.   Breasts:  Negative for tenderness, breast mass and discharge.   Genitourinary:  Negative for dysuria, frequency, genital sores, hematuria, pelvic pain, urgency, vaginal bleeding and vaginal discharge.   Musculoskeletal:  Negative for arthralgias, joint swelling and myalgias.   Skin:  Negative for rash.   Neurological:  Negative for dizziness, weakness, headaches and paresthesias.   Psychiatric/Behavioral:  Negative for mood changes. The patient is not nervous/anxious.          OBJECTIVE:   /84 (BP Location: Right arm, Patient Position: Sitting, Cuff Size: Adult Regular)   Pulse 69   Temp 97.2  F (36.2  C) (Tympanic)   Resp 16   Ht 1.615 m (5' 3.58\")   Wt 63.1 kg (139 lb 3.2 oz)   SpO2 98%   BMI 24.21 kg/m   Estimated body mass index is 24.21 kg/m  as calculated from the following:    Height as of this encounter: 1.615 m (5' 3.58\").    Weight as of this encounter: 63.1 kg (139 lb 3.2 oz).  Physical Exam  GENERAL: healthy, alert and no distress  EYES: Eyes grossly normal to inspection, PERRL and " conjunctivae and sclerae normal  HENT: ear canals and TM's normal, nose and mouth without ulcers or lesions  NECK: no adenopathy, no asymmetry, masses, or scars and thyroid normal to palpation  RESP: lungs clear to auscultation - no rales, rhonchi or wheezes  CV: regular rate and rhythm, normal S1 S2, no S3 or S4, no murmur, click or rub, no peripheral edema and peripheral pulses strong  ABDOMEN: soft, nontender, no hepatosplenomegaly, no masses and bowel sounds normal  MS: no gross musculoskeletal defects noted, no edema  SKIN: pearly papule with central telangectasia on right mid back.  Scars from previous skin cancer removals noted  NEURO: Normal strength and tone, mentation intact and speech normal  PSYCH: mentation appears normal, affect normal/bright        ASSESSMENT / PLAN:   (Z00.00) Encounter for Medicare annual wellness exam  (primary encounter diagnosis)  Comment:   Plan:     (E78.5) Hyperlipidemia LDL goal <100  Comment:  - stable, refill provided  Plan: rosuvastatin (CRESTOR) 20 MG tablet, Lipid         panel reflex to direct LDL Fasting            (M41.9) Scoliosis, unspecified scoliosis type, unspecified spinal region  Comment: continue home exercises  Plan:     (M81.0) Age-related osteoporosis without current pathological fracture  Comment: side effects to fosamax.  Would not start another med in its place  Plan:     (Z85.828) History of basal cell carcinoma  Comment: probable BCC, see Christian  Plan: Adult Dermatology Referral            (M65.30) Trigger finger, acquired  Comment: see ortho to discuss further treatment of trigger finger  Plan: Orthopedic  Referral            Patient has been advised of split billing requirements and indicates understanding: Yes      COUNSELING:  Reviewed preventive health counseling, as reflected in patient instructions        She reports that she quit smoking about 58 years ago. She has quit using smokeless tobacco.      Appropriate preventive services were  discussed with this patient, including applicable screening as appropriate for cardiovascular disease, diabetes, osteopenia/osteoporosis, and glaucoma.  As appropriate for age/gender, discussed screening for colorectal cancer, prostate cancer, breast cancer, and cervical cancer. Checklist reviewing preventive services available has been given to the patient.    Reviewed patients plan of care and provided an AVS. The Complex Care Plan (for patients with higher acuity and needing more deliberate coordination of services) for Romana meets the Care Plan requirement. This Care Plan has been established and reviewed with the Patient.          Vicky Mclean [facility]    Identified Health Risks:  I have reviewed Opioid Use Disorder and Substance Use Disorder risk factors and made any needed referrals. She is at risk for lack of exercise and has been provided with information to increase physical activity for the benefit of her well-being.

## 2023-08-22 ENCOUNTER — HOSPITAL ENCOUNTER (OUTPATIENT)
Dept: MAMMOGRAPHY | Facility: CLINIC | Age: 78
Discharge: HOME | End: 2023-08-22
Attending: INTERNAL MEDICINE | Admitting: INTERNAL MEDICINE
Payer: COMMERCIAL

## 2023-08-22 DIAGNOSIS — Z12.31 VISIT FOR SCREENING MAMMOGRAM: ICD-10-CM

## 2023-08-22 PROCEDURE — 77067 SCR MAMMO BI INCL CAD: CPT

## 2023-09-22 ENCOUNTER — OFFICE VISIT (OUTPATIENT)
Dept: ORTHOPEDICS | Facility: CLINIC | Age: 78
End: 2023-09-22
Payer: COMMERCIAL

## 2023-09-22 VITALS — BODY MASS INDEX: 24.21 KG/M2 | HEIGHT: 64 IN

## 2023-09-22 DIAGNOSIS — M65.342 TRIGGER RING FINGER OF LEFT HAND: Primary | ICD-10-CM

## 2023-09-22 DIAGNOSIS — M65.341 TRIGGER RING FINGER OF RIGHT HAND: ICD-10-CM

## 2023-09-22 PROCEDURE — 99203 OFFICE O/P NEW LOW 30 MIN: CPT | Performed by: FAMILY MEDICINE

## 2023-09-22 ASSESSMENT — PAIN SCALES - GENERAL: PAINLEVEL: NO PAIN (0)

## 2023-09-22 NOTE — LETTER
9/22/2023         RE: Romana Felipe  [address] [place] [address]        Dear Colleague,    Thank you for referring your patient, Romana Felipe, to the [facility]. Please see a copy of my visit note below.    ASSESSMENT & PLAN    Romana was seen today for pain and pain.    Diagnoses and all orders for this visit:    Trigger ring finger of left hand  -     Orthopedic  Referral    Trigger ring finger of right hand  -     Orthopedic  Referral      This issue is acute on chronic and Worsening.    # Bilateral 4th Digit Trigger Fingers: Romana Felipe  was seen today for flare of bilateral 4th trigger fingers. Symptoms had been going on for 15+ years, worsening over the past few months. On examination there are positive findings of triggering over the 4th digits left > right. Likely cause of patient's condition due to flare of bilateral trigger fingers. Counseled patient on nature of condition and treatment options.  Given this plan as below, follow-up as needed     Image Findings: none today  Treatment: Activities as tolerated, home exercises given today  Medications/Injections: Limited tylenol/ibuprofen for pain for 1-2 weeks, Topical Voltaren gel, defer for now  Follow-up: Dr. Bustos for trigger finger release    Augusto Che MD  [facility]    -----  Chief Complaint   Patient presents with     Right Ring Finger - Pain     Left Ring Finger - Pain       SUBJECTIVE  Romana Felipe is a/an 78 year old female who is seen in consultation at the request of  Vicky Mclean D.O. for evaluation of bilateral ring fingers.     The patient is seen by themselves.  The patient is Right handed    Onset: 2 years(s) ago. Reports insidious onset without acute precipitating event. Saw PCP 8/8/23.   Location of Pain: bilateral ring fingers   Worsened by: increased use   Better with: steroid  Acupuncture/TCM Initial Evaluation     Pre-Treatment: Provide Patient Informed consent and PRO   (Promis-10 is used in outpatient settings for initial eval and then every 4 weeks)  INFORMED CONSENT   Informed consent provided and discussed:  [x]  YES    []  NO  PRO provided and discussed:  []  YES    [x]  NO  PRECAUTIONS:    · Precautions:    · SP QI TRIMBLE- not too many needles  · Besides acupuncture have you had any invasive needling techniques performed on you in the past 24 hours  []  YES    [x]  NO  · If yes, and if you have any of the following symptoms you should seek urgent care before receiving an acupuncture treatment today.   · Shortness of breath  · Unexplained cough  · Difficulty breathing or sharp pain with inhalation  · Unexplained pain in your chest especially on one side  · Increased heart rate  · Confusion and/or dizziness  · Blue discoloration in skin and lips  SUBJECTIVE:   · Chief Complaint: Chemotherapy induced Peripheral Neuropathy(PN), Fatigue, Cold intolerant, and Poor sleep.  · Shell is accompanied by her daughter.   · She c/o PN in her hands and feet.  Her hands are not too bad.  Her feet are very numb, feel heavy and has sharp pain.  Nail beds are very dark.  She denies trouble with balance.  She was supposed to start PT the week that she got sick in hospital.  PN since February 2023.  · She reports being very sick the last week of June spending a week in the hospital from chemotherapy.  She has not began chemotherapy again since.    · She c/o poor sleep with PN.  · She is constantly fatigued.    · She is on many medications and reports that they help a little with sleep.  · She has very dry eyes before and worse since chemotherapy.   · She feels cold often.  Encouraged eating warming foods and gave recipes and examples.   ·   · Description of Onset: Chemotherapy, chronic  · Prior Treatment:medication   · Pain & Biopsychosoical Scale  · Self-Reported Pain: 8/10  · Self-Reported  "injection  Treatments tried:  steroid injections 2 and 17 years ago  Associated symptoms: locking     Orthopedic/Surgical history: NO  Social History/Occupation: Retired     No family history pertinent to patient's problem today.      REVIEW OF SYSTEMS:  Review of Systems  Constitutional, HEENT, cardiovascular, pulmonary, gi and gu systems are negative, except as otherwise noted.    OBJECTIVE:  Ht 1.615 m (5' 3.58\")   BMI 24.21 kg/m     General: healthy, alert and in no distress  HEENT: no scleral icterus or conjunctival erythema  Skin: no suspicious lesions or rash. No jaundice.  CV: distal perfusion intact    Resp: normal respiratory effort without conversational dyspnea   Psych: normal mood and affect  Gait: normal steady gait with appropriate coordination and balance    Neuro: Normal light sensory exam of bilateral upper extremities    Ortho Exam   BILATERAL HAND  Inspection:    No swelling, bruising, discoloration, or obvious deformity or asymmetry  Palpation:   Carpals: normal   Metacarpals: normal   Thumb: normal   Fingers: triggering 4th digits  Range of Motion:    Full active flexion and extension at MCP, PIP, and DIP joints; normal finger cascade without malrotation.  Wrist pronation, supination, and ulnar/radial deviation normal.  Strength:     full  Special Tests:    Positive: palpable triggering left 4th > right 4th    Negative: flexor digitorum superficialis testing, flexor digitorum profundus testing    RADIOLOGY:         Review of external notes as documented elsewhere in note       Disclaimer: This note consists of symbols derived from keyboarding, dictation and/or voice recognition software. As a result, there may be errors in the script that have gone undetected. Please consider this when interpreting information found in this chart.      Again, thank you for allowing me to participate in the care of your patient.        Sincerely,        Augusto Che MD  " Biopsychosocial impact of condition on Emotional Wellbeing - How has your condition impacted your:  · Stress: /10   · Activity: /10  · Sleep: /10  · Mood: /10    Patient's Post Treatment Comments and numerical pain score:    · Patient's verbal comments \" My toes are a little more flexible .\"  · Post Pain Score 7/10   ·   Patient Goal(s):   1. Reduce pain and numbness  2. Improve energy  3. Improve dry eye    OBJECTIVE:   Physical Inspection: Observations/Measurements  • Friendly and alert  • Tired eyes  • Dark purple nails  Tongue: purple with blood stasis    Pulses: deep and weak    Traditional Chinese Medicine (TCM) Diagnosis:     · Trauma BI  · Qi and blood stasis    TODAY'S ACUPUNCTURE/TCM TREATMENT:     Acupuncture: Meridians/Points utilized  • ESM, LI 4, LI 11  • ST 36, GB 34, SP 6, LV 3, GB 41  Acupuncture: # of needles used: 16 # of needles out: 16    Other Modalities Utilized:TDP Lamp        Patient Education:   · Nutritional Recommendations  recipes  · Patient Understanding of Education: Verbalizes understanding of education.    TCM Prognosis:       · Patient would benefit from skilled Acupuncture/TCM therapy to achieve stated goals  · Potential Resolution of chief complaint is good.        PLAN OF CARE:   · Frequency/Duration: 2 x a week for 6 weeks  · The plan of care and goals were established with the patient who concurs.    Acupuncture/TCM Daily Billing:    Acupuncture CPT Codes reflect billing claims  Timed Procedures:  · Acupuncture CPT Codes  Untimed Procedures:  · Initial Evaluation CPT Code  Total Time per CPT Codes: 35 minutes    For Medicare Patients: a supervising/billing physician must be identified  Is this a Medicare patient:   []  YES    [x]  NO  If yes use smartphrase .supervisingphysician to document  Integrative Therapy Outcomes    Treatment Provided  Questions Answered By: Patient    Integrative Therapy Provided: Acupuncture  (Select the primary treatment provided)    Setting: Oncology  Clinic  (Outpatient includes: all hospital and clinic-based outpatient)    Primary Complaint/Reason for Treatment: Neuropathy    Secondary Complaint/Reason for Treatment:: Pain      Pre-Treatment Assessment  Pain: 8  (Please rate your current pain on a scale of 0-10 (0 is no pain, 10 is the worst pain))    Neuropathy: 8  (Please rate your current neuropathy (numbness/tingling) on a scale of 0-10 (0 is no numbness/tingling, 10 is worst numbness/tingling))    Nausea: 0  (Please rate your current nausea on a scale 0-10 (0 is no nausea, 10 is worst nausea))    Stress: 5  (Please rate your current stress on a scale of 0-10 (0 is no stress, 10 is the worst stress))      Post-Treatment Assessment  Pain: 5  (What is your pain level after your treatment? Please rate it on a scale of 0-10 (0 is no pain, 10 is the worst pain))    Neuropathy: 7  (What is your numbness or tingling level after your treatment?  Please rate it on a scale of 0-10 (0 is no numbness/tingling, 10 is the worst numbness/tingling))    Nausea: 0  (What is your nausea level after your treatment?  Please rate it on a scale of 0-10 (0 is no nausea, 10 is the worst nausea))    Stress: 2  What is your stress level after your treatment? Please rate it on a scale of 0-10 (0 is no stress, 10 is the worst stress))

## 2023-09-22 NOTE — PROGRESS NOTES
ASSESSMENT & PLAN    Romana was seen today for pain and pain.    Diagnoses and all orders for this visit:    Trigger ring finger of left hand  -     Orthopedic  Referral    Trigger ring finger of right hand  -     Orthopedic  Referral      This issue is acute on chronic and Worsening.    # Bilateral 4th Digit Trigger Fingers: Romana Felipe  was seen today for flare of bilateral 4th trigger fingers. Symptoms had been going on for 15+ years, worsening over the past few months. On examination there are positive findings of triggering over the 4th digits left > right. Likely cause of patient's condition due to flare of bilateral trigger fingers. Counseled patient on nature of condition and treatment options.  Given this plan as below, follow-up as needed     Image Findings: none today  Treatment: Activities as tolerated, home exercises given today  Medications/Injections: Limited tylenol/ibuprofen for pain for 1-2 weeks, Topical Voltaren gel, defer for now  Follow-up: Dr. Bustos for trigger finger release    Augusto Che MD  [facility]    -----  Chief Complaint   Patient presents with    Right Ring Finger - Pain    Left Ring Finger - Pain       SUBJECTIVE  Romana Felipe is a/an 78 year old female who is seen in consultation at the request of  Vicky Mclean D.O. for evaluation of bilateral ring fingers.     The patient is seen by themselves.  The patient is Right handed    Onset: 2 years(s) ago. Reports insidious onset without acute precipitating event. Saw PCP 8/8/23.   Location of Pain: bilateral ring fingers   Worsened by: increased use   Better with: steroid injection  Treatments tried:  steroid injections 2 and 17 years ago  Associated symptoms: locking     Orthopedic/Surgical history: NO  Social History/Occupation: Retired     No family history pertinent to patient's problem today.      REVIEW OF SYSTEMS:  Review of  "Systems  Constitutional, HEENT, cardiovascular, pulmonary, gi and gu systems are negative, except as otherwise noted.    OBJECTIVE:  Ht 1.615 m (5' 3.58\")   BMI 24.21 kg/m     General: healthy, alert and in no distress  HEENT: no scleral icterus or conjunctival erythema  Skin: no suspicious lesions or rash. No jaundice.  CV: distal perfusion intact    Resp: normal respiratory effort without conversational dyspnea   Psych: normal mood and affect  Gait: normal steady gait with appropriate coordination and balance    Neuro: Normal light sensory exam of bilateral upper extremities    Ortho Exam   BILATERAL HAND  Inspection:    No swelling, bruising, discoloration, or obvious deformity or asymmetry  Palpation:   Carpals: normal   Metacarpals: normal   Thumb: normal   Fingers: triggering 4th digits  Range of Motion:    Full active flexion and extension at MCP, PIP, and DIP joints; normal finger cascade without malrotation.  Wrist pronation, supination, and ulnar/radial deviation normal.  Strength:     full  Special Tests:    Positive: palpable triggering left 4th > right 4th    Negative: flexor digitorum superficialis testing, flexor digitorum profundus testing    RADIOLOGY:         Review of external notes as documented elsewhere in note       Disclaimer: This note consists of symbols derived from keyboarding, dictation and/or voice recognition software. As a result, there may be errors in the script that have gone undetected. Please consider this when interpreting information found in this chart.    "

## 2023-09-22 NOTE — PATIENT INSTRUCTIONS
# Bilateral 4th Digit Trigger Fingers: Romana Felipe  was seen today for flare of bilateral 4th trigger fingers. Symptoms had been going on for 15+ years, worsening over the past few months. On examination there are positive findings of triggering over the 4th digits left > right. Likely cause of patient's condition due to flare of bilateral trigger fingers. Counseled patient on nature of condition and treatment options.  Given this plan as below, follow-up as needed     Image Findings: none today  Treatment: Activities as tolerated, home exercises given today  Medications/Injections: Limited tylenol/ibuprofen for pain for 1-2 weeks, Topical Voltaren gel, defer for now  Follow-up: Dr. Bustos for trigger finger release    Please call [phone]   Ask for my team if you have any questions or concerns    If you have not yet received the influenza vaccine but would like to get one, please call  [phone] or you can schedule via [facility]    It was great seeing you today!    Augusto Che MD, CAQSM

## 2023-11-15 PROCEDURE — 88305 TISSUE EXAM BY PATHOLOGIST: CPT | Mod: TC,ORL | Performed by: OPHTHALMOLOGY

## 2023-11-16 ENCOUNTER — LAB REQUISITION (OUTPATIENT)
Dept: LAB | Facility: CLINIC | Age: 78
End: 2023-11-16
Payer: COMMERCIAL

## 2023-11-16 DIAGNOSIS — D48.5 NEOPLASM OF UNCERTAIN BEHAVIOR OF SKIN: ICD-10-CM

## 2023-11-17 LAB
PATH REPORT.COMMENTS IMP SPEC: NORMAL
PATH REPORT.COMMENTS IMP SPEC: NORMAL
PATH REPORT.FINAL DX SPEC: NORMAL
PATH REPORT.GROSS SPEC: NORMAL
PATH REPORT.MICROSCOPIC SPEC OTHER STN: NORMAL
PATH REPORT.RELEVANT HX SPEC: NORMAL
PHOTO IMAGE: NORMAL

## 2023-11-17 PROCEDURE — 88305 TISSUE EXAM BY PATHOLOGIST: CPT | Mod: 26 | Performed by: PATHOLOGY

## 2024-01-23 ENCOUNTER — OFFICE VISIT (OUTPATIENT)
Dept: DERMATOLOGY | Facility: CLINIC | Age: 79
End: 2024-01-23
Payer: COMMERCIAL

## 2024-01-23 DIAGNOSIS — L81.4 LENTIGO: ICD-10-CM

## 2024-01-23 DIAGNOSIS — Z85.828 HISTORY OF BASAL CELL CARCINOMA: ICD-10-CM

## 2024-01-23 DIAGNOSIS — D23.9 DERMAL NEVUS: Primary | ICD-10-CM

## 2024-01-23 DIAGNOSIS — L82.1 SEBORRHEIC KERATOSES: ICD-10-CM

## 2024-01-23 DIAGNOSIS — L57.0 KERATOSIS: ICD-10-CM

## 2024-01-23 DIAGNOSIS — C44.519 BASAL CELL CARCINOMA (BCC) OF BACK: ICD-10-CM

## 2024-01-23 DIAGNOSIS — D18.01 ANGIOMA OF SKIN: ICD-10-CM

## 2024-01-23 PROCEDURE — 11103 TANGNTL BX SKIN EA SEP/ADDL: CPT | Performed by: DERMATOLOGY

## 2024-01-23 PROCEDURE — 88331 PATH CONSLTJ SURG 1 BLK 1SPC: CPT | Performed by: DERMATOLOGY

## 2024-01-23 PROCEDURE — 11102 TANGNTL BX SKIN SINGLE LES: CPT | Performed by: DERMATOLOGY

## 2024-01-23 PROCEDURE — 99213 OFFICE O/P EST LOW 20 MIN: CPT | Mod: 25 | Performed by: DERMATOLOGY

## 2024-01-23 NOTE — PATIENT INSTRUCTIONS
Wound Care Instructions     FOR SUPERFICIAL WOUNDS     [facility] [phone]    [facility] [phone]                       AFTER 24 HOURS YOU SHOULD REMOVE THE BANDAGE AND BEGIN DAILY DRESSING CHANGES AS FOLLOWS:     1) Remove Dressing.     2) Clean and dry the area with tap water using a Q-tip or sterile gauze pad.     3) Apply Vaseline, Aquaphor, Polysporin ointment or Bacitracin ointment over entire wound.  Do NOT use Neosporin ointment.     4) Cover the wound with a band-aid, or a sterile non-stick gauze pad and micropore paper tape      REPEAT THESE INSTRUCTIONS AT LEAST ONCE A DAY UNTIL THE WOUND HAS COMPLETELY HEALED.    It is an old wives tale that a wound heals better when it is exposed to air and allowed to dry out. The wound will heal faster with a better cosmetic result if it is kept moist with ointment and covered with a bandage.    **Do not let the wound dry out.**      Supplies Needed:      *Cotton tipped applicators (Q-tips)    *Polysporin Ointment or Bacitracin Ointment (NOT NEOSPORIN)    *Band-aids or non-stick gauze pads and micropore paper tape.      PATIENT INFORMATION:    During the healing process you will notice a number of changes. All wounds develop a small halo of redness surrounding the wound.  This means healing is occurring. Severe itching with extensive redness usually indicates sensitivity to the ointment or bandage tape used to dress the wound.  You should call our office if this develops.      Swelling  and/or discoloration around your surgical site is common, particularly when performed around the eye.    All wounds normally drain.  The larger the wound the more drainage there will be.  After 7-10 days, you will notice the wound beginning to shrink and new skin will begin to grow.  The wound is healed when you can see skin has formed over the entire area.  A healed wound has a healthy, shiny look to the surface and is red to dark pink in color  to normalize.  Wounds may take approximately 4-6 weeks to heal.  Larger wounds may take 6-8 weeks.  After the wound is healed you may discontinue dressing changes.    You may experience a sensation of tightness as your wound heals. This is normal and will gradually subside.    Your healed wound may be sensitive to temperature changes. This sensitivity improves with time, but if you re having a lot of discomfort, try to avoid temperature extremes.    Patients frequently experience itching after their wound appears to have healed because of the continue healing under the skin.  Plain Vaseline will help relieve the itching.        POSSIBLE COMPLICATIONS    BLEEDING:    Leave the bandage in place.  Use tightly rolled up gauze or a cloth to apply direct pressure over the bandage for 30  minutes.  Reapply pressure for an additional 30 minutes if necessary  Use additional gauze and tape to maintain pressure once the bleeding has stopped.

## 2024-01-23 NOTE — NURSING NOTE
Romana Felipe's chief complaint for this visit includes:  Chief Complaint   Patient presents with    Skin Check     Patient has a spot on back she is concerned about.     PCP: Vicky Mclean    Referring Provider:  Vicky Mclean DO  [address] [place] [address]    There were no vitals taken for this visit.  Data Unavailable      No Known Allergies      Do you need any medication refills at today's visit? No    Juliana Mcgarry MA

## 2024-01-23 NOTE — PROGRESS NOTES
Romana Felipe , a 78 year old year old female patient, I was asked to see by Dr. Mclean for hx of skin cancer.  Patient has no other skin complaints today.  Remainder of the HPI, Meds, PMH, Allergies, FH, and SH was reviewed in chart.      Past Medical History:   Diagnosis Date    Basal cell carcinoma        Past Surgical History:   Procedure Laterality Date    APPENDECTOMY      BLADDER SUSPENSION      HYSTERECTOMY      HYSTERECTOMY      OOPHORECTOMY      TOTAL HIP ARTHROPLASTY Left     TOTAL KNEE ARTHROPLASTY Right         Family History   Problem Relation Age of Onset    Dementia Mother     Myocardial Infarction Father     No Known Problems Sister     No Known Problems Daughter     No Known Problems Maternal Grandmother     No Known Problems Maternal Grandfather     No Known Problems Paternal Grandmother     No Known Problems Paternal Grandfather     No Known Problems Maternal Aunt     No Known Problems Paternal Aunt     Hereditary Breast and Ovarian Cancer Syndrome No family hx of     Breast Cancer No family hx of     Cancer No family hx of     Colon Cancer No family hx of     Endometrial Cancer No family hx of     Ovarian Cancer No family hx of        Social History     Socioeconomic History    Marital status:      Spouse name: Not on file    Number of children: Not on file    Years of education: Not on file    Highest education level: Not on file   Occupational History    Not on file   Tobacco Use    Smoking status: Former     Years: 2     Types: Cigarettes     Quit date: 1965     Years since quittin.0    Smokeless tobacco: Former    Tobacco comments:     in college    Vaping Use    Vaping Use: Never used   Substance and Sexual Activity    Alcohol use: Yes     Comment: holidays or special occasions     Drug use: Never    Sexual activity: Not Currently   Other Topics Concern    Not on file   Social History Narrative    Not on file     Social Determinants of Health     Financial Resource  Strain: Not on file   Food Insecurity: Not on file   Transportation Needs: Not on file   Physical Activity: Not on file   Stress: Not on file   Social Connections: Not on file   Interpersonal Safety: Not on file   Housing Stability: Not on file       Outpatient Encounter Medications as of 1/23/2024   Medication Sig Dispense Refill    ibuprofen (ADVIL/MOTRIN) 600 MG tablet Take 600 mg by mouth every 6 hours as needed for moderate pain      melatonin 5 MG tablet Take 5 mg by mouth nightly as needed for sleep      Multiple Vitamins-Minerals (PRESERVISION AREDS 2) CAPS Take 2 capsules by mouth daily One in AM one in PM      rosuvastatin (CRESTOR) 20 MG tablet Take 1 tablet (20 mg) by mouth daily 90 tablet 3    VITAMIN D, CHOLECALCIFEROL, PO Take 2,000 Units by mouth daily D3       No facility-administered encounter medications on file as of 1/23/2024.             Review Of Systems  Skin: As above  Eyes: negative  Ears/Nose/Throat: negative  Respiratory: No shortness of breath, dyspnea on exertion, cough, or hemoptysis  Cardiovascular: negative  Gastrointestinal: negative  Genitourinary: negative  Musculoskeletal: negative  Neurologic: negative  Psychiatric: negative  Hematologic/Lymphatic/Immunologic: negative  Endocrine: negative      O:   NAD, WDWN, Alert & Oriented, Mood & Affect wnl, Vitals stable   General appearance madison ii   Vitals stable   Alert, oriented and in no acute distress      Following lymph nodes palpated: Occipital, Cervical, Supraclavicular no lad  R mid back red plaque 1.5x2  L upper cut lip red scaly papule  0.6     Stuck on papules and brown macules on trunk and ext    Red papules on trunk   Flesh colored papules on trunk      The remainder of the full exam was normal; the following areas were examined:  conjunctiva/lids, , neck, peripheral vascular system, abdomen, lymph nodes, digits/nails, eccrine and apocrine glands, scalp/hair, face, neck, chest, abdomen, buttocks, back, RUE, LUE, RLE, LLE        Eyes: Conjunctivae/lids:Normal     ENT: Lips, buccal mucosa, tongue: normal    MSK:Normal    Cardiovascular: peripheral edema none    Pulm: Breathing Normal    Lymph Nodes: No Head and Neck Lymphadenopathy     Neuro/Psych: Orientation:Normal; Mood/Affect:Normal      MICRO:     R mid back (red):Orthokeratosis of epidermis with a proliferation of nests of basaloid cells, with peripheral palisading and a haphazard arrangement in the center extending into the dermis, .  The tumor cells have hyperchromatic nuclei. Poor cytoplasm and intercellular bridging.    L upper cut lip (blue):There is hyperkeratosis of the epidermis, overlying banal keratinocytes, the dermis is unremarkable.   A/P:  1. Seborrheic keratosis, lentigo, angioma, dermal nevus, hx of non-melanoma skin cancer   2. R/o basal cell carcinoma   TANGENTIAL BIOPSY IN HOUSE:  After consent, anesthesia with LEC and prep, tangential excision performed and dx above confirmed with frozen section histology.  No complications and routine wound care.  Patient is not on  anticoagulants and risk of bleeding discussed with patient.       I have personally reviewed all specimens and/or slides and used them with my medical judgement to determine or confirm the final diagnosis.     Patient told result   R mid back basal cell carcinoma schedule   L upper cut lip   keratosis No further treatment      It was a pleasure speaking to Romana Felipe today.  Previous clinic  notes and pertinent laboratory tests were reviewed prior to Romana Felipe's visit.  Signs and Symptoms of skin cancer discussed with patient.  Patient encouraged to perform monthly skin exams.  UV precautions reviewed with patient.  Risks of non-melanoma skin cancer discussed with patient   Return to clinic next appt

## 2024-01-23 NOTE — LETTER
2024         RE: Romana Felipe  [address] [facility] [address]        Dear Colleague,    Thank you for referring your patient, Romana Felipe, to the [facility]. Please see a copy of my visit note below.    Romana Felipe , a 78 year old year old female patient, I was asked to see by Dr. Mclean for hx of skin cancer.  Patient has no other skin complaints today.  Remainder of the HPI, Meds, PMH, Allergies, FH, and SH was reviewed in chart.      Past Medical History:   Diagnosis Date     Basal cell carcinoma        Past Surgical History:   Procedure Laterality Date     APPENDECTOMY       BLADDER SUSPENSION       HYSTERECTOMY       HYSTERECTOMY       OOPHORECTOMY       TOTAL HIP ARTHROPLASTY Left      TOTAL KNEE ARTHROPLASTY Right         Family History   Problem Relation Age of Onset     Dementia Mother      Myocardial Infarction Father      No Known Problems Sister      No Known Problems Daughter      No Known Problems Maternal Grandmother      No Known Problems Maternal Grandfather      No Known Problems Paternal Grandmother      No Known Problems Paternal Grandfather      No Known Problems Maternal Aunt      No Known Problems Paternal Aunt      Hereditary Breast and Ovarian Cancer Syndrome No family hx of      Breast Cancer No family hx of      Cancer No family hx of      Colon Cancer No family hx of      Endometrial Cancer No family hx of      Ovarian Cancer No family hx of        Social History     Socioeconomic History     Marital status:      Spouse name: Not on file     Number of children: Not on file     Years of education: Not on file     Highest education level: Not on file   Occupational History     Not on file   Tobacco Use     Smoking status: Former     Years: 2     Types: Cigarettes     Quit date: 1965     Years since quittin.0     Smokeless tobacco: Former     Tobacco comments:     in college    Vaping Use     Vaping Use: Never used    Substance and Sexual Activity     Alcohol use: Yes     Comment: holidays or special occasions      Drug use: Never     Sexual activity: Not Currently   Other Topics Concern     Not on file   Social History Narrative     Not on file     Social Determinants of Health     Financial Resource Strain: Not on file   Food Insecurity: Not on file   Transportation Needs: Not on file   Physical Activity: Not on file   Stress: Not on file   Social Connections: Not on file   Interpersonal Safety: Not on file   Housing Stability: Not on file       Outpatient Encounter Medications as of 1/23/2024   Medication Sig Dispense Refill     ibuprofen (ADVIL/MOTRIN) 600 MG tablet Take 600 mg by mouth every 6 hours as needed for moderate pain       melatonin 5 MG tablet Take 5 mg by mouth nightly as needed for sleep       Multiple Vitamins-Minerals (PRESERVISION AREDS 2) CAPS Take 2 capsules by mouth daily One in AM one in PM       rosuvastatin (CRESTOR) 20 MG tablet Take 1 tablet (20 mg) by mouth daily 90 tablet 3     VITAMIN D, CHOLECALCIFEROL, PO Take 2,000 Units by mouth daily D3       No facility-administered encounter medications on file as of 1/23/2024.             Review Of Systems  Skin: As above  Eyes: negative  Ears/Nose/Throat: negative  Respiratory: No shortness of breath, dyspnea on exertion, cough, or hemoptysis  Cardiovascular: negative  Gastrointestinal: negative  Genitourinary: negative  Musculoskeletal: negative  Neurologic: negative  Psychiatric: negative  Hematologic/Lymphatic/Immunologic: negative  Endocrine: negative      O:   NAD, WDWN, Alert & Oriented, Mood & Affect wnl, Vitals stable   General appearance madison ii   Vitals stable   Alert, oriented and in no acute distress      Following lymph nodes palpated: Occipital, Cervical, Supraclavicular no lad  R mid back red plaque 1.5x2  L upper cut lip red scaly papule  0.6     Stuck on papules and brown macules on trunk and ext    Red papules on trunk   Flesh colored  papules on trunk      The remainder of the full exam was normal; the following areas were examined:  conjunctiva/lids, , neck, peripheral vascular system, abdomen, lymph nodes, digits/nails, eccrine and apocrine glands, scalp/hair, face, neck, chest, abdomen, buttocks, back, RUE, LUE, RLE, LLE       Eyes: Conjunctivae/lids:Normal     ENT: Lips, buccal mucosa, tongue: normal    MSK:Normal    Cardiovascular: peripheral edema none    Pulm: Breathing Normal    Lymph Nodes: No Head and Neck Lymphadenopathy     Neuro/Psych: Orientation:Normal; Mood/Affect:Normal      MICRO:     R mid back (red):Orthokeratosis of epidermis with a proliferation of nests of basaloid cells, with peripheral palisading and a haphazard arrangement in the center extending into the dermis, .  The tumor cells have hyperchromatic nuclei. Poor cytoplasm and intercellular bridging.    L upper cut lip (blue):There is hyperkeratosis of the epidermis, overlying banal keratinocytes, the dermis is unremarkable.   A/P:  1. Seborrheic keratosis, lentigo, angioma, dermal nevus, hx of non-melanoma skin cancer   2. R/o basal cell carcinoma   TANGENTIAL BIOPSY IN HOUSE:  After consent, anesthesia with LEC and prep, tangential excision performed and dx above confirmed with frozen section histology.  No complications and routine wound care.  Patient is not on  anticoagulants and risk of bleeding discussed with patient.       I have personally reviewed all specimens and/or slides and used them with my medical judgement to determine or confirm the final diagnosis.     Patient told result   R mid back basal cell carcinoma schedule   L upper cut lip   keratosis No further treatment      It was a pleasure speaking to Romana Felipe today.  Previous clinic  notes and pertinent laboratory tests were reviewed prior to Romana Felipe's visit.  Signs and Symptoms of skin cancer discussed with patient.  Patient encouraged to perform monthly skin exams.  UV  precautions reviewed with patient.  Risks of non-melanoma skin cancer discussed with patient   Return to clinic next appt      Again, thank you for allowing me to participate in the care of your patient.        Sincerely,        Williams Westbrook MD

## 2024-01-30 NOTE — PROGRESS NOTES
Surgical Office Location :   [facility]  [address] [place] [address]

## 2024-01-31 ENCOUNTER — OFFICE VISIT (OUTPATIENT)
Dept: DERMATOLOGY | Facility: CLINIC | Age: 79
End: 2024-01-31
Payer: COMMERCIAL

## 2024-01-31 DIAGNOSIS — C44.519 BASAL CELL CARCINOMA (BCC) OF BACK: Primary | ICD-10-CM

## 2024-01-31 PROCEDURE — 17313 MOHS 1 STAGE T/A/L: CPT | Performed by: DERMATOLOGY

## 2024-01-31 PROCEDURE — 13101 CMPLX RPR TRUNK 2.6-7.5 CM: CPT | Performed by: DERMATOLOGY

## 2024-01-31 NOTE — LETTER
1/31/2024         RE: Romana Felipe  [address] [facility] [address]        Dear Colleague,    Thank you for referring your patient, Romana Felipe, to the [facility]. Please see a copy of my visit note below.    Surgical Office Location :   [facility]  [address] [place] [address]      Romana Felipe is an extremely pleasant 78 year old year old female patient here today for evaluation and managment of basal cell carcinoma on back.  Patient has no other skin complaints today.  Remainder of the HPI, Meds, PMH, Allergies, FH, and SH was reviewed in chart.      Past Medical History:   Diagnosis Date     Basal cell carcinoma        Past Surgical History:   Procedure Laterality Date     APPENDECTOMY       BLADDER SUSPENSION       HYSTERECTOMY       HYSTERECTOMY       OOPHORECTOMY       TOTAL HIP ARTHROPLASTY Left      TOTAL KNEE ARTHROPLASTY Right         Family History   Problem Relation Age of Onset     Dementia Mother      Myocardial Infarction Father      No Known Problems Sister      No Known Problems Daughter      No Known Problems Maternal Grandmother      No Known Problems Maternal Grandfather      No Known Problems Paternal Grandmother      No Known Problems Paternal Grandfather      No Known Problems Maternal Aunt      No Known Problems Paternal Aunt      Hereditary Breast and Ovarian Cancer Syndrome No family hx of      Breast Cancer No family hx of      Cancer No family hx of      Colon Cancer No family hx of      Endometrial Cancer No family hx of      Ovarian Cancer No family hx of        Social History     Socioeconomic History     Marital status:      Spouse name: Not on file     Number of children: Not on file     Years of education: Not on file     Highest education level: Not on file   Occupational History     Not on file   Tobacco Use     Smoking status: Former     Years: 2     Types: Cigarettes     Quit date: 1/1/1965      Years since quittin.1     Smokeless tobacco: Former     Tobacco comments:     in college    Vaping Use     Vaping Use: Never used   Substance and Sexual Activity     Alcohol use: Yes     Comment: holidays or special occasions      Drug use: Never     Sexual activity: Not Currently   Other Topics Concern     Not on file   Social History Narrative     Not on file     Social Determinants of Health     Financial Resource Strain: Not on file   Food Insecurity: Not on file   Transportation Needs: Not on file   Physical Activity: Not on file   Stress: Not on file   Social Connections: Not on file   Interpersonal Safety: Not on file   Housing Stability: Not on file       Outpatient Encounter Medications as of 2024   Medication Sig Dispense Refill     ibuprofen (ADVIL/MOTRIN) 600 MG tablet Take 600 mg by mouth every 6 hours as needed for moderate pain       melatonin 5 MG tablet Take 5 mg by mouth nightly as needed for sleep       Multiple Vitamins-Minerals (PRESERVISION AREDS 2) CAPS Take 2 capsules by mouth daily One in AM one in PM       rosuvastatin (CRESTOR) 20 MG tablet Take 1 tablet (20 mg) by mouth daily 90 tablet 3     VITAMIN D, CHOLECALCIFEROL, PO Take 2,000 Units by mouth daily D3       No facility-administered encounter medications on file as of 2024.             O:   NAD, WDWN, Alert & Oriented, Mood & Affect wnl, Vitals stable   General appearance normal   Vitals stable   Alert, oriented and in no acute distress   R back 1.5x2cm red plaque      Eyes: Conjunctivae/lids:Normal     ENT: Lips, buccal mucosa, tongue: normal    MSK:Normal    Cardiovascular: peripheral edema none    Pulm: Breathing Normal    Neuro/Psych: Orientation:Alert and Orientedx3 ; Mood/Affect:normal       A/P:  Basal cell carcinoma back   MOHS:   Size    The rationale for Mohs surgery was discussed with the patient and consent was obtained.  The risks and benefits as well as alternatives to therapy were discussed, in detail.   Specifically, the risks of infection, scarring, bleeding, prolonged wound healing, incomplete removal, allergy to anesthesia, nerve injury and recurrence were addressed.  Indication for Mohs was Size. Prior to the procedure, the treatment site was clearly identified and, if available, confirmed with previous photos and confirmed by the patient   All components of the Universal Protocol/PAUSE rule were completed.  The Mohs surgeon operated in two distinct and integrated capacities as the surgeon and pathologist.      The area was prepped with Betasept.  A rim of normal appearing skin was marked circumferentially around the lesion.  The area was infiltrated with local anesthesia.  The tumor was first debulked to remove all clinically apparent tumor.  An incision following the standard Mohs approach was done and the specimen was oriented,mapped and placed in 1 block(s).  Each specimen was then chromacoded and processed in the Mohs laboratory using standard Mohs technique and submitted for frozen section histology.  Frozen section analysis showed residual tumor but CLEAR MARGINS.    1st stage:Orthokeratosis of epidermis with a proliferation of nests of basaloid cells, with peripheral palisading and a haphazard arrangement in the center extending into the dermis, forming nodules.  The tumor cells have hyperchromatic nuclei. Poor cytoplasm and intercellular bridging.      The tumor was excised using standard Mohs technique in 1 stages(s).  CLEAR MARGINS OBTAINED and Final defect size was 2.6 x 2 cm.     We discussed the options for wound management in full with the patient including risks/benefits/ possible outcomes.      REPAIR COMPLEX: Because of the tightness of the surrounding skin and Because of the size and full thickness nature of the defect, Because of the tightness of the surrounding skin, To maintain form and function, and In order to avoid distortion, a complex closure was planned. After LE anesthesia and prep,  Burow's triangles were excised in the relaxed skin tension lines. The wound edges were widely undermined greater than width of the defect on both sides by dissection in the subcutaneous plane until adequate tissue mobility was obtained. Hemostasis was obtained. The wound edges were closed in a layered fashion using Vicryl and Fast Absorbing Plain Gut sutures. Postoperative length was 3.5 cm.   EBL minimal; complications none; wound care routine.  The patient was discharged in good condition and will return in one week for wound evaluation.  It was a pleasure speaking to Romana Felipe today.  Previous clinic notes and pertinent laboratory tests were reviewed prior to Romana Felipe's visit.  Signs and Symptoms of skin cancer discussed with patient.  Patient encouraged to perform monthly skin exams.  UV precautions reviewed with patient.  Risks of non-melanoma skin cancer discussed with patient   Return to clinic 6 months        Again, thank you for allowing me to participate in the care of your patient.        Sincerely,        Williams Westbrook MD

## 2024-01-31 NOTE — PATIENT INSTRUCTIONS
Sutured Wound Care     [facility]: [phone]  [facility]: [phone]    Right Mid Back     No strenuous activity for 48 hours. Resume moderate activity in 48 hours. No heavy exercising until you are seen for follow up in one week.     Take Tylenol as needed for discomfort.                         Do not drink alcoholic beverages for 48 hours.     Keep the pressure bandage in place for 24 hours. If the bandage becomes blood tinged or loose, reinforce it with gauze and tape.        (Refer to the reverse side of this page for management of bleeding).    Remove pressure bandage in 24 hours (White Gauze & White Tape)     Leave the flat bandage in place until your follow up appointment. (Light Blue Tape & Steri Strips)     Keep the bandage dry. Wash around it carefully.    If the tape becomes soiled or starts to come off, reinforce it with additional paper tape.    Do not smoke for 3 weeks; smoking is detrimental to wound healing.    It is normal to have swelling and bruising around the surgical site. The bruising will fade in approximately 10-14 days. Elevate the area to reduce swelling.    Numbness, itchiness and sensitivity to temperature changes can occur after surgery and may take up to 18 months to normalize.      POSSIBLE COMPLICATIONS    BLEEDING:    Leave the bandage in place.  Use tightly rolled up gauze or a cloth to apply direct pressure over the bandage for 20   minutes.  Reapply pressure for an additional 20 minutes if necessary  Call the office or go to the nearest emergency room if pressure fails to stop the bleeding.  Use additional gauze and tape to maintain pressure once the bleeding has stopped.    PAIN:    Post operative pain should slowly get better, never worse.  A severe increase in pain may indicate a problem. Call the office if this occurs.    In case of emergency phone:Dr Westbrook [phone]

## 2024-01-31 NOTE — PROGRESS NOTES
Romana Felipe is an extremely pleasant 78 year old year old female patient here today for evaluation and managment of basal cell carcinoma on back.  Patient has no other skin complaints today.  Remainder of the HPI, Meds, PMH, Allergies, FH, and SH was reviewed in chart.      Past Medical History:   Diagnosis Date    Basal cell carcinoma        Past Surgical History:   Procedure Laterality Date    APPENDECTOMY      BLADDER SUSPENSION      HYSTERECTOMY      HYSTERECTOMY      OOPHORECTOMY      TOTAL HIP ARTHROPLASTY Left     TOTAL KNEE ARTHROPLASTY Right         Family History   Problem Relation Age of Onset    Dementia Mother     Myocardial Infarction Father     No Known Problems Sister     No Known Problems Daughter     No Known Problems Maternal Grandmother     No Known Problems Maternal Grandfather     No Known Problems Paternal Grandmother     No Known Problems Paternal Grandfather     No Known Problems Maternal Aunt     No Known Problems Paternal Aunt     Hereditary Breast and Ovarian Cancer Syndrome No family hx of     Breast Cancer No family hx of     Cancer No family hx of     Colon Cancer No family hx of     Endometrial Cancer No family hx of     Ovarian Cancer No family hx of        Social History     Socioeconomic History    Marital status:      Spouse name: Not on file    Number of children: Not on file    Years of education: Not on file    Highest education level: Not on file   Occupational History    Not on file   Tobacco Use    Smoking status: Former     Years: 2     Types: Cigarettes     Quit date: 1965     Years since quittin.1    Smokeless tobacco: Former    Tobacco comments:     in college    Vaping Use    Vaping Use: Never used   Substance and Sexual Activity    Alcohol use: Yes     Comment: holidays or special occasions     Drug use: Never    Sexual activity: Not Currently   Other Topics Concern    Not on file   Social History Narrative    Not on file     Social Determinants  of Health     Financial Resource Strain: Not on file   Food Insecurity: Not on file   Transportation Needs: Not on file   Physical Activity: Not on file   Stress: Not on file   Social Connections: Not on file   Interpersonal Safety: Not on file   Housing Stability: Not on file       Outpatient Encounter Medications as of 1/31/2024   Medication Sig Dispense Refill    ibuprofen (ADVIL/MOTRIN) 600 MG tablet Take 600 mg by mouth every 6 hours as needed for moderate pain      melatonin 5 MG tablet Take 5 mg by mouth nightly as needed for sleep      Multiple Vitamins-Minerals (PRESERVISION AREDS 2) CAPS Take 2 capsules by mouth daily One in AM one in PM      rosuvastatin (CRESTOR) 20 MG tablet Take 1 tablet (20 mg) by mouth daily 90 tablet 3    VITAMIN D, CHOLECALCIFEROL, PO Take 2,000 Units by mouth daily D3       No facility-administered encounter medications on file as of 1/31/2024.             O:   NAD, WDWN, Alert & Oriented, Mood & Affect wnl, Vitals stable   General appearance normal   Vitals stable   Alert, oriented and in no acute distress   R back 1.5x2cm red plaque      Eyes: Conjunctivae/lids:Normal     ENT: Lips, buccal mucosa, tongue: normal    MSK:Normal    Cardiovascular: peripheral edema none    Pulm: Breathing Normal    Neuro/Psych: Orientation:Alert and Orientedx3 ; Mood/Affect:normal       A/P:  Basal cell carcinoma back   MOHS:   Size    The rationale for Mohs surgery was discussed with the patient and consent was obtained.  The risks and benefits as well as alternatives to therapy were discussed, in detail.  Specifically, the risks of infection, scarring, bleeding, prolonged wound healing, incomplete removal, allergy to anesthesia, nerve injury and recurrence were addressed.  Indication for Mohs was Size. Prior to the procedure, the treatment site was clearly identified and, if available, confirmed with previous photos and confirmed by the patient   All components of the Universal Protocol/PAUSE rule  were completed.  The Mohs surgeon operated in two distinct and integrated capacities as the surgeon and pathologist.      The area was prepped with Betasept.  A rim of normal appearing skin was marked circumferentially around the lesion.  The area was infiltrated with local anesthesia.  The tumor was first debulked to remove all clinically apparent tumor.  An incision following the standard Mohs approach was done and the specimen was oriented,mapped and placed in 1 block(s).  Each specimen was then chromacoded and processed in the Mohs laboratory using standard Mohs technique and submitted for frozen section histology.  Frozen section analysis showed residual tumor but CLEAR MARGINS.    1st stage:Orthokeratosis of epidermis with a proliferation of nests of basaloid cells, with peripheral palisading and a haphazard arrangement in the center extending into the dermis, forming nodules.  The tumor cells have hyperchromatic nuclei. Poor cytoplasm and intercellular bridging.      The tumor was excised using standard Mohs technique in 1 stages(s).  CLEAR MARGINS OBTAINED and Final defect size was 2.6 x 2 cm.     We discussed the options for wound management in full with the patient including risks/benefits/ possible outcomes.      REPAIR COMPLEX: Because of the tightness of the surrounding skin and Because of the size and full thickness nature of the defect, Because of the tightness of the surrounding skin, To maintain form and function, and In order to avoid distortion, a complex closure was planned. After LE anesthesia and prep, Burow's triangles were excised in the relaxed skin tension lines. The wound edges were widely undermined greater than width of the defect on both sides by dissection in the subcutaneous plane until adequate tissue mobility was obtained. Hemostasis was obtained. The wound edges were closed in a layered fashion using Vicryl and Fast Absorbing Plain Gut sutures. Postoperative length was 3.5 cm.    EBL minimal; complications none; wound care routine.  The patient was discharged in good condition and will return in one week for wound evaluation.  It was a pleasure speaking to Romana Felipe today.  Previous clinic notes and pertinent laboratory tests were reviewed prior to Romana Felipe's visit.  Signs and Symptoms of skin cancer discussed with patient.  Patient encouraged to perform monthly skin exams.  UV precautions reviewed with patient.  Risks of non-melanoma skin cancer discussed with patient   Return to clinic 6 months

## 2024-02-07 ENCOUNTER — ALLIED HEALTH/NURSE VISIT (OUTPATIENT)
Dept: DERMATOLOGY | Facility: CLINIC | Age: 79
End: 2024-02-07
Payer: COMMERCIAL

## 2024-02-07 DIAGNOSIS — Z48.01 ENCOUNTER FOR CHANGE OR REMOVAL OF SURGICAL WOUND DRESSING: Primary | ICD-10-CM

## 2024-02-07 PROCEDURE — 99207 PR NO CHARGE NURSE ONLY: CPT

## 2024-02-07 NOTE — PROGRESS NOTES
Romana Felipe comes into clinic today at the request of Dr. Westbrook Ordering Provider for Wound Check Action taken: See Below.    This service provided today was under the supervising provider of the day Dr. Westbrook, who was available if needed.    Pt returned to clinic for post surgery 1 week follow up bandage change. Pt has no complaints, denies pain. Bandage removed from back, area cleansed with normal saline. Site is healing and wound edges approximating well. Reapplied new steri strips and paper tape.    Advised to watch for signs/sx of infection; spreading redness, drainage, odor, fever. Call or report promptly to clinic. Pt given written instructions and informed to rtc as needed. Patient verbalized understanding.     Lorrie Melchor on 2/7/2024 at 1:04 PM

## 2024-02-07 NOTE — PATIENT INSTRUCTIONS
Wound care instructions for  ONE WEEK AFTER SURGERY    Leave the bandage on for 1 week after today's bandage change.  In 1 week you may resume your regular skin care when you remove the dressing. This includes washing with mild soap and water, applying moisturizer, make-up and sunscreen.  If the wound is open or bleeding in any part of the incision/graft site, you should cover the area with a bandage daily as directed below:    Clean and dry area with plain water using a Q-tip or sterile gauze pad.  Apply vaseline, aquaphor, polysporin, or bacitracin ointment to the wound  Cover the wound with a band-aid or a sterile non-stick gauze pad and micropore paper tape.      Repeat the instructions above until wound is completely healed    If you notice that the scar is red and firm (after you remove the dressing) this is normal and will fade over time. It may take up to 6-12 months for this to occur.    Massaging the area helps the scar fade and soften quicker. Begin to massage the area one month after the dressings have been removed. Apply pressure directly and firmly over the scar with the fingertips and move in circular motions. You may massage up to 10 times daily, each time for 30 seconds.    It is normal for there to be a tender, pimple-like bump along the scar about 6-8 weeks after surgery. This sometimes happens as the scar matures and the stitches beneath begin to dissolve. Do not pick or squeeze. It will resolve in time. If an area of the wound does open and there appears to be drainage, you may apply one of the ointments listed in the above directions a few times every day until the wound is completely healed.    Numbness around the surgical site is normal. It can take up to 12-18 months for the feeling to return to normal. Itchiness, tingling, and occasional sharp pains might be present during this portion of the healing. All of these feelings will subside once the nerves have completely healed.      IN CASE OF  EMERGENCY: Dr Westbrook [phone]     If you were seen in [place] call: [phone]    If you were seen in [place] call: [phone]

## 2024-03-29 ENCOUNTER — OFFICE (OUTPATIENT)
Dept: URBAN - METROPOLITAN AREA CLINIC 9 | Facility: CLINIC | Age: 79
End: 2024-03-29
Payer: MEDICAID

## 2024-03-29 VITALS
DIASTOLIC BLOOD PRESSURE: 66 MMHG | OXYGEN SATURATION: 96 % | SYSTOLIC BLOOD PRESSURE: 177 MMHG | SYSTOLIC BLOOD PRESSURE: 186 MMHG | DIASTOLIC BLOOD PRESSURE: 65 MMHG | HEIGHT: 63 IN | WEIGHT: 154 LBS | HEART RATE: 70 BPM

## 2024-03-29 DIAGNOSIS — I25.10 ATHEROSCLEROTIC HEART DISEASE OF NATIVE CORONARY ARTERY WITH: ICD-10-CM

## 2024-03-29 DIAGNOSIS — E11.9 TYPE 2 DIABETES MELLITUS WITHOUT COMPLICATIONS: ICD-10-CM

## 2024-03-29 DIAGNOSIS — K31.84 GASTROPARESIS: ICD-10-CM

## 2024-03-29 DIAGNOSIS — R10.13 EPIGASTRIC PAIN: ICD-10-CM

## 2024-03-29 DIAGNOSIS — R11.2 NAUSEA WITH VOMITING, UNSPECIFIED: ICD-10-CM

## 2024-03-29 PROCEDURE — 99214 OFFICE O/P EST MOD 30 MIN: CPT | Performed by: NURSE PRACTITIONER

## 2024-03-29 RX ORDER — PANTOPRAZOLE 40 MG/1
40 TABLET, DELAYED RELEASE ORAL
Qty: 90 | Refills: 3 | Status: ACTIVE

## 2024-04-22 ENCOUNTER — TRANSFERRED RECORDS (OUTPATIENT)
Dept: HEALTH INFORMATION MANAGEMENT | Facility: CLINIC | Age: 79
End: 2024-04-22
Payer: COMMERCIAL

## 2024-04-24 ENCOUNTER — TRANSFERRED RECORDS (OUTPATIENT)
Dept: HEALTH INFORMATION MANAGEMENT | Facility: CLINIC | Age: 79
End: 2024-04-24

## 2024-05-31 ENCOUNTER — OFFICE (OUTPATIENT)
Dept: URBAN - METROPOLITAN AREA CLINIC 9 | Facility: CLINIC | Age: 79
End: 2024-05-31
Payer: COMMERCIAL

## 2024-05-31 VITALS
DIASTOLIC BLOOD PRESSURE: 68 MMHG | HEART RATE: 71 BPM | SYSTOLIC BLOOD PRESSURE: 185 MMHG | OXYGEN SATURATION: 100 % | WEIGHT: 147 LBS | HEIGHT: 64 IN | DIASTOLIC BLOOD PRESSURE: 64 MMHG | SYSTOLIC BLOOD PRESSURE: 183 MMHG

## 2024-05-31 DIAGNOSIS — K85.10 BILIARY ACUTE PANCREATITIS WITHOUT NECROSIS OR INFECTION: ICD-10-CM

## 2024-05-31 DIAGNOSIS — K80.20 CALCULUS OF GALLBLADDER WITHOUT CHOLECYSTITIS WITHOUT OBSTRU: ICD-10-CM

## 2024-05-31 DIAGNOSIS — K81.1 CHRONIC CHOLECYSTITIS: ICD-10-CM

## 2024-05-31 DIAGNOSIS — K80.50 CALCULUS OF BILE DUCT WITHOUT CHOLANGITIS OR CHOLECYSTITIS W: ICD-10-CM

## 2024-05-31 DIAGNOSIS — I25.10 ATHEROSCLEROTIC HEART DISEASE OF NATIVE CORONARY ARTERY WITH: ICD-10-CM

## 2024-05-31 PROCEDURE — 99214 OFFICE O/P EST MOD 30 MIN: CPT | Performed by: NURSE PRACTITIONER

## 2024-06-19 ENCOUNTER — ON CAMPUS - OUTPATIENT (OUTPATIENT)
Dept: URBAN - METROPOLITAN AREA HOSPITAL 97 | Facility: HOSPITAL | Age: 79
End: 2024-06-19
Payer: COMMERCIAL

## 2024-06-19 DIAGNOSIS — K80.51 CALCULUS OF BILE DUCT WITHOUT CHOLANGITIS OR CHOLECYSTITIS W: ICD-10-CM

## 2024-06-19 PROCEDURE — 43277 ERCP EA DUCT/AMPULLA DILATE: CPT | Performed by: INTERNAL MEDICINE

## 2024-06-19 PROCEDURE — 43261 ENDO CHOLANGIOPANCREATOGRAPH: CPT | Mod: 59 | Performed by: INTERNAL MEDICINE

## 2024-06-19 PROCEDURE — 43275 ERCP REMOVE FORGN BODY DUCT: CPT | Performed by: INTERNAL MEDICINE

## 2024-08-07 ENCOUNTER — TELEPHONE (OUTPATIENT)
Dept: DERMATOLOGY | Facility: CLINIC | Age: 79
End: 2024-08-07

## 2024-08-07 ENCOUNTER — OFFICE VISIT (OUTPATIENT)
Dept: DERMATOLOGY | Facility: CLINIC | Age: 79
End: 2024-08-07
Payer: COMMERCIAL

## 2024-08-07 DIAGNOSIS — L82.1 SEBORRHEIC KERATOSES: ICD-10-CM

## 2024-08-07 DIAGNOSIS — D18.01 ANGIOMA OF SKIN: ICD-10-CM

## 2024-08-07 DIAGNOSIS — L81.4 LENTIGO: ICD-10-CM

## 2024-08-07 DIAGNOSIS — Z85.828 HISTORY OF SKIN CANCER: ICD-10-CM

## 2024-08-07 DIAGNOSIS — D23.9 DERMAL NEVUS: Primary | ICD-10-CM

## 2024-08-07 DIAGNOSIS — L57.0 AK (ACTINIC KERATOSIS): ICD-10-CM

## 2024-08-07 PROCEDURE — 88331 PATH CONSLTJ SURG 1 BLK 1SPC: CPT | Performed by: DERMATOLOGY

## 2024-08-07 PROCEDURE — 99213 OFFICE O/P EST LOW 20 MIN: CPT | Mod: 25 | Performed by: DERMATOLOGY

## 2024-08-07 PROCEDURE — 11102 TANGNTL BX SKIN SINGLE LES: CPT | Performed by: DERMATOLOGY

## 2024-08-07 NOTE — LETTER
2024      Romana Felipe  [address] [facility] [address]      Dear Colleague,    Thank you for referring your patient, Romana Felipe, to the [facility]. Please see a copy of my visit note below.    Romana Felipe is an extremely pleasant 79 year old year old female patient here today for hx of non-melanoma skin cancer.  Patient has no other skin complaints today.  Remainder of the HPI, Meds, PMH, Allergies, FH, and SH was reviewed in chart.      Past Medical History:   Diagnosis Date     Basal cell carcinoma        Past Surgical History:   Procedure Laterality Date     APPENDECTOMY       BLADDER SUSPENSION       HYSTERECTOMY       HYSTERECTOMY       OOPHORECTOMY       TOTAL HIP ARTHROPLASTY Left      TOTAL KNEE ARTHROPLASTY Right         Family History   Problem Relation Age of Onset     Dementia Mother      Myocardial Infarction Father      No Known Problems Sister      No Known Problems Daughter      No Known Problems Maternal Grandmother      No Known Problems Maternal Grandfather      No Known Problems Paternal Grandmother      No Known Problems Paternal Grandfather      No Known Problems Maternal Aunt      No Known Problems Paternal Aunt      Hereditary Breast and Ovarian Cancer Syndrome No family hx of      Breast Cancer No family hx of      Cancer No family hx of      Colon Cancer No family hx of      Endometrial Cancer No family hx of      Ovarian Cancer No family hx of        Social History     Socioeconomic History     Marital status:      Spouse name: Not on file     Number of children: Not on file     Years of education: Not on file     Highest education level: Not on file   Occupational History     Not on file   Tobacco Use     Smoking status: Former     Current packs/day: 0.00     Types: Cigarettes     Start date: 1963     Quit date: 1965     Years since quittin.6     Smokeless tobacco: Former     Tobacco comments:     in college    Vaping  Use     Vaping status: Never Used   Substance and Sexual Activity     Alcohol use: Yes     Comment: holidays or special occasions      Drug use: Never     Sexual activity: Not Currently   Other Topics Concern     Not on file   Social History Narrative     Not on file     Social Determinants of Health     Financial Resource Strain: Not on file   Food Insecurity: Not on file   Transportation Needs: Not on file   Physical Activity: Not on file   Stress: Not on file   Social Connections: Not on file   Interpersonal Safety: Not on file   Housing Stability: Not on file       Outpatient Encounter Medications as of 8/7/2024   Medication Sig Dispense Refill     ibuprofen (ADVIL/MOTRIN) 600 MG tablet Take 600 mg by mouth every 6 hours as needed for moderate pain       melatonin 5 MG tablet Take 5 mg by mouth nightly as needed for sleep       Multiple Vitamins-Minerals (PRESERVISION AREDS 2) CAPS Take 2 capsules by mouth daily One in AM one in PM       rosuvastatin (CRESTOR) 20 MG tablet Take 1 tablet (20 mg) by mouth daily 90 tablet 3     VITAMIN D, CHOLECALCIFEROL, PO Take 2,000 Units by mouth daily D3       No facility-administered encounter medications on file as of 8/7/2024.             O:   NAD, WDWN, Alert & Oriented, Mood & Affect wnl, Vitals stable   General appearance normal   Vitals stable   Alert, oriented and in no acute distress     L sup ab scaly bleeding papule    Stuck on papules and brown macules on trunk and ext   Red papules on trunk  Flesh colored papules on trunk     The remainder of the full exam was normal; the following areas were examined:  conjunctiva/lids, , neck, peripheral vascular system, abdomen, lymph nodes, digits/nails, eccrine and apocrine glands, scalp/hair, face, neck, chest, abdomen, buttocks, back, RUE, LUE, RLE, LLE       Eyes: Conjunctivae/lids:Normal     ENT: Lips, mucosa: normal    MSK:Normal    Cardiovascular: peripheral edema none    Pulm: Breathing Normal    Lymph Nodes: No Head  and Neck Lymphadenopathy     Neuro/Psych: Orientation:Alert and Orientedx3 ; Mood/Affect:normal       MICRO:   L sup ab:There is hyperkeratosis and focal parakeratosis of the epidermis, overlying atypical keratinocytes,  by areas of orthokeratosis, there are scattered basal atypical keratinocytes: with varying degrees of overlying loss of maturation, hyperchromatism, pleomorphism, increased and abnormal mitoses, dyskeratosis.  The dermis shows a variable inflammatory infiltrate.   A/P:  1. Seborrheic keratosis, lentigo, angioma, dermal nevus, hx of non-melanoma skin cancer   2. L sup ab r/o basal cell carcinoma   TANGENTIAL BIOPSY IN HOUSE:  After consent, anesthesia with LEC and prep, tangential excision performed and dx above confirmed with frozen section histology.  No complications and routine wound care.  Patient is not on  anticoagulants and risk of bleeding discussed with patient.       I have personally reviewed all specimens and/or slides and used them with my medical judgement to determine or confirm the final diagnosis.     Patient told result actinic keratosis schedule cryo .      It was a pleasure speaking to Romana Felipe today.  Previous clinic notes and pertinent laboratory tests were reviewed prior to Romana Felipe's visit.  Nature and genetics of benign skin lesions dicussed with patient.  Signs and Symptoms of skin cancer discussed with patient.  Patient encouraged to perform monthly skin exams.  UV precautions reviewed with patient.  Skin care regimen reviewed with patient: Eliminate harsh soaps, i.e. Dial, zest, irsih spring; Mild soaps such as Cetaphil or Dove sensitive skin, avoid hot or cold showers, aggressive use of emollients including vanicream, cetaphil or cerave discussed with patient.    Risks of non-melanoma skin cancer discussed with patient   Return to clinic 12 months      Again, thank you for allowing me to participate in the care of your patient.         Sincerely,        Williams Westbrook MD

## 2024-08-07 NOTE — TELEPHONE ENCOUNTER
----- Message from Williams Westbrook sent at 8/7/2024 11:43 AM CDT -----  L superior ab:actinic keratosis schedule cryo

## 2024-08-07 NOTE — PATIENT INSTRUCTIONS
Wound Care Instructions     FOR SUPERFICIAL WOUNDS     [facility] [phone]    [facility] [phone]    Left upper abdomen                 AFTER 24 HOURS YOU SHOULD REMOVE THE BANDAGE AND BEGIN DAILY DRESSING CHANGES AS FOLLOWS:     1) Remove Dressing.     2) Clean and dry the area with tap water using a Q-tip or sterile gauze pad.     3) Apply Vaseline, Aquaphor, Polysporin ointment or Bacitracin ointment over entire wound.  Do NOT use Neosporin ointment.     4) Cover the wound with a band-aid, or a sterile non-stick gauze pad and micropore paper tape      REPEAT THESE INSTRUCTIONS AT LEAST ONCE A DAY UNTIL THE WOUND HAS COMPLETELY HEALED.    It is an old wives tale that a wound heals better when it is exposed to air and allowed to dry out. The wound will heal faster with a better cosmetic result if it is kept moist with ointment and covered with a bandage.    **Do not let the wound dry out.**      Supplies Needed:      *Cotton tipped applicators (Q-tips)    *Polysporin Ointment or Bacitracin Ointment (NOT NEOSPORIN)    *Band-aids or non-stick gauze pads and micropore paper tape.      PATIENT INFORMATION:    During the healing process you will notice a number of changes. All wounds develop a small halo of redness surrounding the wound.  This means healing is occurring. Severe itching with extensive redness usually indicates sensitivity to the ointment or bandage tape used to dress the wound.  You should call our office if this develops.      Swelling  and/or discoloration around your surgical site is common, particularly when performed around the eye.    All wounds normally drain.  The larger the wound the more drainage there will be.  After 7-10 days, you will notice the wound beginning to shrink and new skin will begin to grow.  The wound is healed when you can see skin has formed over the entire area.  A healed wound has a healthy, shiny look to the surface and is red to dark pink  in color to normalize.  Wounds may take approximately 4-6 weeks to heal.  Larger wounds may take 6-8 weeks.  After the wound is healed you may discontinue dressing changes.    You may experience a sensation of tightness as your wound heals. This is normal and will gradually subside.    Your healed wound may be sensitive to temperature changes. This sensitivity improves with time, but if you re having a lot of discomfort, try to avoid temperature extremes.    Patients frequently experience itching after their wound appears to have healed because of the continue healing under the skin.  Plain Vaseline will help relieve the itching.    POSSIBLE COMPLICATIONS    BLEEDING:    Leave the bandage in place.  Use tightly rolled up gauze or a cloth to apply direct pressure over the bandage for 30  minutes.  Reapply pressure for an additional 30 minutes if necessary  Use additional gauze and tape to maintain pressure once the bleeding has stopped.         Proper skin care from [facility]:    -Eliminate harsh soaps as they strip the natural oils from the skin, often resulting in dry itchy skin ( i.e. Dial, Zest, Shi Spring)  -Use mild soaps such as Cetaphil or Dove Sensitive Skin in the shower. You do not need to use soap on arms, legs, and trunk every time you shower unless visibly soiled.   -Avoid hot or cold showers.  -After showering, lightly dry off and apply moisturizing within 2-3 minutes. This will help trap moisture in the skin.   -Aggressive use of a moisturizer at least 1-2 times a day to the entire body (including -Vanicream, Cetaphil, Aquaphor or Cerave) and moisturize hands after every washing.  -We recommend using moisturizers that come in a tub that needs to be scooped out, not a pump. This has more of an oil base. It will hold moisture in your skin much better than a water base moisturizer. The above recommended are non-pore clogging.      Wear a sunscreen with at least SPF 30 on your face, ears,  neck and V of the chest daily. Wear sunscreen on other areas of the body if those areas are exposed to the sun throughout the day. Sunscreens can contain physical and/or chemical blockers. Physical blockers are less likely to clog pores, these include zinc oxide and titanium dioxide. Reapply every two hour and after swimming.     Sunscreen examples: https://www.ewg.org/sunscreen/    UV radiation  UVA radiation remains constant throughout the day and throughout the year. It is a longer wavelength than UVB and therefore penetrates deeper into the skin leading to immediate and delayed tanning, photoaging, and skin cancer. 70-80% of UVA and UVB radiation occurs between the hours of 10am-2pm.  UVB radiation  UVB radiation causes the most harmful effects and is more significant during the summer months. However, snow and ice can reflect UVB radiation leading to skin damage during the winter months as well. UVB radiation is responsible for tanning, burning, inflammation, delayed erythema (pinkness), pigmentation (brown spots), and skin cancer.     I recommend self monthly full body exams and yearly full body exams with a dermatology provider. If you develop a new or changing lesion please follow up for examination. Most skin cancers are pink and scaly or pink and pearly. However, we do see blue/brown/black skin cancers.  Consider the ABCDEs of melanoma when giving yourself your monthly full body exam ( don't forget the groin, buttocks, feet, toes, etc). A-asymmetry, B-borders, C-color, D-diameter, E-elevation or evolving. If you see any of these changes please follow up in clinic. If you cannot see your back I recommend purchasing a hand held mirror to use with a larger wall mirror.       Checking for Skin Cancer  You can find cancer early by checking your skin each month. There are 3 kinds of skin cancer. They are melanoma, basal cell carcinoma, and squamous cell carcinoma. Doing monthly skin checks is the best way to  find new marks or skin changes. Follow the instructions below for checking your skin.   The ABCDEs of checking moles for melanoma   Check your moles or growths for signs of melanoma using ABCDE:   Asymmetry: the sides of the mole or growth don t match  Border: the edges are ragged, notched, or blurred  Color: the color within the mole or growth varies  Diameter: the mole or growth is larger than 6 mm (size of a pencil eraser)  Evolving: the size, shape, or color of the mole or growth is changing (evolving is not shown in the images below)    Checking for other types of skin cancer  Basal cell carcinoma or squamous cell carcinoma have symptoms such as:     A spot or mole that looks different from all other marks on your skin  Changes in how an area feels, such as itching, tenderness, or pain  Changes in the skin's surface, such as oozing, bleeding, or scaliness  A sore that does not heal  New swelling or redness beyond the border of a mole    Who s at risk?  Anyone can get skin cancer. But you are at greater risk if you have:   Fair skin, light-colored hair, or light-colored eyes  Many moles or abnormal moles on your skin  A history of sunburns from sunlight or tanning beds  A family history of skin cancer  A history of exposure to radiation or chemicals  A weakened immune system  If you have had skin cancer in the past, you are at risk for recurring skin cancer.   How to check your skin  Do your monthly skin checkups in front of a full-length mirror. Check all parts of your body, including your:   Head (ears, face, neck, and scalp)  Torso (front, back, and sides)  Arms (tops, undersides, upper, and lower armpits)  Hands (palms, backs, and fingers, including under the nails)  Buttocks and genitals  Legs (front, back, and sides)  Feet (tops, soles, toes, including under the nails, and between toes)  If you have a lot of moles, take digital photos of them each month. Make sure to take photos both up close and from a  distance. These can help you see if any moles change over time.   Most skin changes are not cancer. But if you see any changes in your skin, call your doctor right away. Only he or she can diagnose a problem. If you have skin cancer, seeing your doctor can be the first step toward getting the treatment that could save your life.   [employer] last reviewed this educational content on 4/1/2019 2000-2020 [employer]. [address] [place] [address], [place] [address]. All rights reserved. This information is not intended as a substitute for professional medical care. Always follow your healthcare professional's instructions.       When should I call my doctor?  If you are worsening or not improving, please, contact us or seek urgent care as noted below.     Who should I call with questions (adults)?    [facility] [phone]  For urgent needs outside of business hours call the [facility] at [phone] and ask for the dermatology resident on call to be paged  If this is a medical emergency and you are unable to reach an ER, Call [phone]      If you need a prescription refill, please contact your pharmacy. Refills are approved or denied by our Physicians during normal business hours, Monday through Fridays  Per office policy, refills will not be granted if you have not been seen within the past year (or sooner depending on your child's condition)

## 2024-08-07 NOTE — PROGRESS NOTES
Romana Felipe is an extremely pleasant 79 year old year old female patient here today for hx of non-melanoma skin cancer.  Patient has no other skin complaints today.  Remainder of the HPI, Meds, PMH, Allergies, FH, and SH was reviewed in chart.      Past Medical History:   Diagnosis Date    Basal cell carcinoma        Past Surgical History:   Procedure Laterality Date    APPENDECTOMY      BLADDER SUSPENSION      HYSTERECTOMY      HYSTERECTOMY      OOPHORECTOMY      TOTAL HIP ARTHROPLASTY Left     TOTAL KNEE ARTHROPLASTY Right         Family History   Problem Relation Age of Onset    Dementia Mother     Myocardial Infarction Father     No Known Problems Sister     No Known Problems Daughter     No Known Problems Maternal Grandmother     No Known Problems Maternal Grandfather     No Known Problems Paternal Grandmother     No Known Problems Paternal Grandfather     No Known Problems Maternal Aunt     No Known Problems Paternal Aunt     Hereditary Breast and Ovarian Cancer Syndrome No family hx of     Breast Cancer No family hx of     Cancer No family hx of     Colon Cancer No family hx of     Endometrial Cancer No family hx of     Ovarian Cancer No family hx of        Social History     Socioeconomic History    Marital status:      Spouse name: Not on file    Number of children: Not on file    Years of education: Not on file    Highest education level: Not on file   Occupational History    Not on file   Tobacco Use    Smoking status: Former     Current packs/day: 0.00     Types: Cigarettes     Start date: 1963     Quit date: 1965     Years since quittin.6    Smokeless tobacco: Former    Tobacco comments:     in college    Vaping Use    Vaping status: Never Used   Substance and Sexual Activity    Alcohol use: Yes     Comment: holidays or special occasions     Drug use: Never    Sexual activity: Not Currently   Other Topics Concern    Not on file   Social History Narrative    Not on file      Social Determinants of Health     Financial Resource Strain: Not on file   Food Insecurity: Not on file   Transportation Needs: Not on file   Physical Activity: Not on file   Stress: Not on file   Social Connections: Not on file   Interpersonal Safety: Not on file   Housing Stability: Not on file       Outpatient Encounter Medications as of 8/7/2024   Medication Sig Dispense Refill    ibuprofen (ADVIL/MOTRIN) 600 MG tablet Take 600 mg by mouth every 6 hours as needed for moderate pain      melatonin 5 MG tablet Take 5 mg by mouth nightly as needed for sleep      Multiple Vitamins-Minerals (PRESERVISION AREDS 2) CAPS Take 2 capsules by mouth daily One in AM one in PM      rosuvastatin (CRESTOR) 20 MG tablet Take 1 tablet (20 mg) by mouth daily 90 tablet 3    VITAMIN D, CHOLECALCIFEROL, PO Take 2,000 Units by mouth daily D3       No facility-administered encounter medications on file as of 8/7/2024.             O:   NAD, WDWN, Alert & Oriented, Mood & Affect wnl, Vitals stable   General appearance normal   Vitals stable   Alert, oriented and in no acute distress     L sup ab scaly bleeding papule    Stuck on papules and brown macules on trunk and ext   Red papules on trunk  Flesh colored papules on trunk     The remainder of the full exam was normal; the following areas were examined:  conjunctiva/lids, , neck, peripheral vascular system, abdomen, lymph nodes, digits/nails, eccrine and apocrine glands, scalp/hair, face, neck, chest, abdomen, buttocks, back, RUE, LUE, RLE, LLE       Eyes: Conjunctivae/lids:Normal     ENT: Lips, mucosa: normal    MSK:Normal    Cardiovascular: peripheral edema none    Pulm: Breathing Normal    Lymph Nodes: No Head and Neck Lymphadenopathy     Neuro/Psych: Orientation:Alert and Orientedx3 ; Mood/Affect:normal       MICRO:   L sup ab:There is hyperkeratosis and focal parakeratosis of the epidermis, overlying atypical keratinocytes,  by areas of orthokeratosis, there are  scattered basal atypical keratinocytes: with varying degrees of overlying loss of maturation, hyperchromatism, pleomorphism, increased and abnormal mitoses, dyskeratosis.  The dermis shows a variable inflammatory infiltrate.   A/P:  1. Seborrheic keratosis, lentigo, angioma, dermal nevus, hx of non-melanoma skin cancer   2. L sup ab r/o basal cell carcinoma   TANGENTIAL BIOPSY IN HOUSE:  After consent, anesthesia with LEC and prep, tangential excision performed and dx above confirmed with frozen section histology.  No complications and routine wound care.  Patient is not on  anticoagulants and risk of bleeding discussed with patient.       I have personally reviewed all specimens and/or slides and used them with my medical judgement to determine or confirm the final diagnosis.     Patient told result actinic keratosis schedule cryo .      It was a pleasure speaking to Romana Felipe today.  Previous clinic notes and pertinent laboratory tests were reviewed prior to Romana Felipe's visit.  Nature and genetics of benign skin lesions dicussed with patient.  Signs and Symptoms of skin cancer discussed with patient.  Patient encouraged to perform monthly skin exams.  UV precautions reviewed with patient.  Skin care regimen reviewed with patient: Eliminate harsh soaps, i.e. Dial, zest, irsih spring; Mild soaps such as Cetaphil or Dove sensitive skin, avoid hot or cold showers, aggressive use of emollients including vanicream, cetaphil or cerave discussed with patient.    Risks of non-melanoma skin cancer discussed with patient   Return to clinic 12 months

## 2024-08-08 NOTE — TELEPHONE ENCOUNTER
Patient Contact    Attempt # 2    Was call answered?  No.    Called patient. No answer. Left message to call back. Clinic number was provided.     Wendy Grover RN    [facility]   [phone]

## 2024-08-08 NOTE — TELEPHONE ENCOUNTER
[facility] Call Center    Phone Message    May a detailed message be left on voicemail: yes     Reason for Call: Other: Pt is returning a call to discuss results. Please call back today before noon or Friday. She states she might be available intermittently this afternoon. Thank you.      Action Taken: Other: [facility] [facility]    Travel Screening: Not Applicable     Date of Service:

## 2024-08-09 NOTE — TELEPHONE ENCOUNTER
Spoke with patient and reviewed results. Pt verbalized understanding. Appt made for cryo.  Margarita DOWD RN BSN PHN  Specialty Clinics

## 2024-08-13 ENCOUNTER — OFFICE VISIT (OUTPATIENT)
Dept: FAMILY MEDICINE | Facility: CLINIC | Age: 79
End: 2024-08-13
Payer: COMMERCIAL

## 2024-08-13 VITALS
DIASTOLIC BLOOD PRESSURE: 84 MMHG | HEART RATE: 73 BPM | SYSTOLIC BLOOD PRESSURE: 118 MMHG | OXYGEN SATURATION: 98 % | RESPIRATION RATE: 14 BRPM | HEIGHT: 63 IN | WEIGHT: 140.4 LBS | BODY MASS INDEX: 24.88 KG/M2 | TEMPERATURE: 97.9 F

## 2024-08-13 DIAGNOSIS — M19.041 PRIMARY OSTEOARTHRITIS OF BOTH HANDS: ICD-10-CM

## 2024-08-13 DIAGNOSIS — J31.0 NON-ALLERGIC RHINITIS: ICD-10-CM

## 2024-08-13 DIAGNOSIS — E78.5 HYPERLIPIDEMIA LDL GOAL <100: ICD-10-CM

## 2024-08-13 DIAGNOSIS — Z00.00 ENCOUNTER FOR MEDICARE ANNUAL WELLNESS EXAM: Primary | ICD-10-CM

## 2024-08-13 DIAGNOSIS — M19.042 PRIMARY OSTEOARTHRITIS OF BOTH HANDS: ICD-10-CM

## 2024-08-13 DIAGNOSIS — Z12.31 VISIT FOR SCREENING MAMMOGRAM: ICD-10-CM

## 2024-08-13 DIAGNOSIS — M81.0 AGE-RELATED OSTEOPOROSIS WITHOUT CURRENT PATHOLOGICAL FRACTURE: ICD-10-CM

## 2024-08-13 LAB
ANION GAP SERPL CALCULATED.3IONS-SCNC: 9 MMOL/L (ref 7–15)
BUN SERPL-MCNC: 16 MG/DL (ref 8–23)
CALCIUM SERPL-MCNC: 9.8 MG/DL (ref 8.8–10.4)
CHLORIDE SERPL-SCNC: 105 MMOL/L (ref 98–107)
CHOLEST SERPL-MCNC: 188 MG/DL
CREAT SERPL-MCNC: 0.71 MG/DL (ref 0.51–0.95)
EGFRCR SERPLBLD CKD-EPI 2021: 86 ML/MIN/1.73M2
FASTING STATUS PATIENT QL REPORTED: YES
FASTING STATUS PATIENT QL REPORTED: YES
GLUCOSE SERPL-MCNC: 100 MG/DL (ref 70–99)
HCO3 SERPL-SCNC: 28 MMOL/L (ref 22–29)
HDLC SERPL-MCNC: 69 MG/DL
LDLC SERPL CALC-MCNC: 87 MG/DL
NONHDLC SERPL-MCNC: 119 MG/DL
POTASSIUM SERPL-SCNC: 4.6 MMOL/L (ref 3.4–5.3)
SODIUM SERPL-SCNC: 142 MMOL/L (ref 135–145)
TRIGL SERPL-MCNC: 159 MG/DL

## 2024-08-13 PROCEDURE — 80061 LIPID PANEL: CPT | Performed by: INTERNAL MEDICINE

## 2024-08-13 PROCEDURE — 99214 OFFICE O/P EST MOD 30 MIN: CPT | Mod: 25 | Performed by: INTERNAL MEDICINE

## 2024-08-13 PROCEDURE — 36415 COLL VENOUS BLD VENIPUNCTURE: CPT | Performed by: INTERNAL MEDICINE

## 2024-08-13 PROCEDURE — 80048 BASIC METABOLIC PNL TOTAL CA: CPT | Performed by: INTERNAL MEDICINE

## 2024-08-13 PROCEDURE — G0439 PPPS, SUBSEQ VISIT: HCPCS | Performed by: INTERNAL MEDICINE

## 2024-08-13 RX ORDER — AZELASTINE 1 MG/ML
1 SPRAY, METERED NASAL 2 TIMES DAILY
Qty: 30 ML | Refills: 11 | Status: SHIPPED | OUTPATIENT
Start: 2024-08-13

## 2024-08-13 RX ORDER — ROSUVASTATIN CALCIUM 20 MG/1
20 TABLET, COATED ORAL DAILY
Qty: 90 TABLET | Refills: 3 | Status: SHIPPED | OUTPATIENT
Start: 2024-08-13

## 2024-08-13 SDOH — HEALTH STABILITY: PHYSICAL HEALTH: ON AVERAGE, HOW MANY DAYS PER WEEK DO YOU ENGAGE IN MODERATE TO STRENUOUS EXERCISE (LIKE A BRISK WALK)?: 3 DAYS

## 2024-08-13 ASSESSMENT — SOCIAL DETERMINANTS OF HEALTH (SDOH): HOW OFTEN DO YOU GET TOGETHER WITH FRIENDS OR RELATIVES?: ONCE A WEEK

## 2024-08-13 ASSESSMENT — PAIN SCALES - GENERAL: PAINLEVEL: EXTREME PAIN (8)

## 2024-08-13 NOTE — PATIENT INSTRUCTIONS
Non-allergic Rhinitis  Start astelin nasal spray twice daily x 2-4 weeks  Discussed proper nasal spray use.  Aim for inner corner of same side eye, small sniff.      Osteoporosis   Recommend getting updated bone density  Recommend to see Endocrinology   Continue with physical therapy for bone healthy, posture, reducing fall risk    Hand Arthritis  Can try voltaren gel  Recommend follow-up with Dr. Cota - steroid injection in thumb?  Trigger finger release     Patient Education   Preventive Care Advice   This is general advice given by our system to help you stay healthy. However, your care team may have specific advice just for you. Please talk to your care team about your preventive care needs.  Nutrition  Eat 5 or more servings of fruits and vegetables each day.  Try wheat bread, brown rice and whole grain pasta (instead of white bread, rice, and pasta).  Get enough calcium and vitamin D. Check the label on foods and aim for 100% of the RDA (recommended daily allowance).  Lifestyle  Exercise at least 150 minutes each week  (30 minutes a day, 5 days a week).  Do muscle strengthening activities 2 days a week. These help control your weight and prevent disease.  No smoking.  Wear sunscreen to prevent skin cancer.  Have a dental exam and cleaning every 6 months.  Yearly exams  See your health care team every year to talk about:  Any changes in your health.  Any medicines your care team has prescribed.  Preventive care, family planning, and ways to prevent chronic diseases.  Shots (vaccines)   HPV shots (up to age 26), if you've never had them before.  Hepatitis B shots (up to age 59), if you've never had them before.  COVID-19 shot: Get this shot when it's due.  Flu shot: Get a flu shot every year.  Tetanus shot: Get a tetanus shot every 10 years.  Pneumococcal, hepatitis A, and RSV shots: Ask your care team if you need these based on your risk.  Shingles shot (for age 50 and up)  General health tests  Diabetes  screening:  Starting at age 35, Get screened for diabetes at least every 3 years.  If you are younger than age 35, ask your care team if you should be screened for diabetes.  Cholesterol test: At age 39, start having a cholesterol test every 5 years, or more often if advised.  Bone density scan (DEXA): At age 50, ask your care team if you should have this scan for osteoporosis (brittle bones).  Hepatitis C: Get tested at least once in your life.  STIs (sexually transmitted infections)  Before age 24: Ask your care team if you should be screened for STIs.  After age 24: Get screened for STIs if you're at risk. You are at risk for STIs (including HIV) if:  You are sexually active with more than one person.  You don't use condoms every time.  You or a partner was diagnosed with a sexually transmitted infection.  If you are at risk for HIV, ask about PrEP medicine to prevent HIV.  Get tested for HIV at least once in your life, whether you are at risk for HIV or not.  Cancer screening tests  Cervical cancer screening: If you have a cervix, begin getting regular cervical cancer screening tests starting at age 21.  Breast cancer scan (mammogram): If you've ever had breasts, begin having regular mammograms starting at age 40. This is a scan to check for breast cancer.  Colon cancer screening: It is important to start screening for colon cancer at age 45.  Have a colonoscopy test every 10 years (or more often if you're at risk) Or, ask your provider about stool tests like a FIT test every year or Cologuard test every 3 years.  To learn more about your testing options, visit:   .  For help making a decision, visit:   https://bit.ly/oc33045.  Prostate cancer screening test: If you have a prostate, ask your care team if a prostate cancer screening test (PSA) at age 55 is right for you.  Lung cancer screening: If you are a current or former smoker ages 50 to 80, ask your care team if ongoing lung cancer screenings are right for  you.  For informational purposes only. Not to replace the advice of your health care provider. Copyright   2023 [facility]. All rights reserved. Clinically reviewed by the [facility]. [employer] [id number] - REV 01/24.  Preventing Falls: Care Instructions  Injuries and health problems such as trouble walking or poor eyesight can increase your risk of falling. So can some medicines. But there are things you can do to help prevent falls. You can exercise to get stronger. You can also arrange your home to make it safer.    Talk to your doctor about the medicines you take. Ask if any of them increase the risk of falls and whether they can be changed or stopped.   Try to exercise regularly. It can help improve your strength and balance. This can help lower your risk of falling.     Practice fall safety and prevention.    Wear low-heeled shoes that fit well and give your feet good support. Talk to your doctor if you have foot problems that make this hard.  Carry a cellphone or wear a medical alert device that you can use to call for help.  Use stepladders instead of chairs to reach high objects. Don't climb if you're at risk for falls. Ask for help, if needed.  Wear the correct eyeglasses, if you need them.    Make your home safer.    Remove rugs, cords, clutter, and furniture from walkways.  Keep your house well lit. Use night-lights in hallways and bathrooms.  Install and use sturdy handrails on stairways.  Wear nonskid footwear, even inside. Don't walk barefoot or in socks without shoes.    Be safe outside.    Use handrails, curb cuts, and ramps whenever possible.  Keep your hands free by using a shoulder bag or backpack.  Try to walk in well-lit areas. Watch out for uneven ground, changes in pavement, and debris.  Be careful in the winter. Walk on the grass or gravel when sidewalks are slippery. Use de-icer on steps and walkways. Add non-slip devices to shoes.    Put grab bars and  "nonskid mats in your shower or tub and near the toilet. Try to use a shower chair or bath bench when bathing.   Get into a tub or shower by putting in your weaker leg first. Get out with your strong side first. Have a phone or medical alert device in the bathroom with you.   Where can you learn more?  Go to https://www.[employer]/patiented  Enter G117 in the search box to learn more about \"Preventing Falls: Care Instructions.\"  Current as of: July 17, 2023               Content Version: 14.0    1186-0133 [employer].   Care instructions adapted under license by your healthcare professional. If you have questions about a medical condition or this instruction, always ask your healthcare professional. [employer] disclaims any warranty or liability for your use of this information.      Hearing Loss: Care Instructions  Overview     Hearing loss is a sudden or slow decrease in how well you hear. It can range from slight to profound. Permanent hearing loss can occur with aging. It also can happen when you are exposed long-term to loud noise. Examples include listening to loud music, riding motorcycles, or being around other loud machines.  Hearing loss can affect your work and home life. It can make you feel lonely or depressed. You may feel that you have lost your independence. But hearing aids and other devices can help you hear better and feel connected to others.  Follow-up care is a key part of your treatment and safety. Be sure to make and go to all appointments, and call your doctor if you are having problems. It's also a good idea to know your test results and keep a list of the medicines you take.  How can you care for yourself at home?  Avoid loud noises whenever possible. This helps keep your hearing from getting worse.  Always wear hearing protection around loud noises.  Wear a hearing aid as directed.  A professional can help you pick a hearing aid that will work best for " "you.  You can also get hearing aids over the counter for mild to moderate hearing loss.  Have hearing tests as your doctor suggests. They can show whether your hearing has changed. Your hearing aid may need to be adjusted.  Use other devices as needed. These may include:  Telephone amplifiers and hearing aids that can connect to a television, stereo, radio, or microphone.  Devices that use lights or vibrations. These alert you to the doorbell, a ringing telephone, or a baby monitor.  Television closed-captioning. This shows the words at the bottom of the screen. Most new TVs can do this.  TTY (text telephone). This lets you type messages back and forth on the telephone instead of talking or listening. These devices are also called TDD. When messages are typed on the keyboard, they are sent over the phone line to a receiving TTY. The message is shown on a monitor.  Use text messaging, social media, and email if it is hard for you to communicate by telephone.  Try to learn a listening technique called speechreading. It is not lipreading. You pay attention to people's gestures, expressions, posture, and tone of voice. These clues can help you understand what a person is saying. Face the person you are talking to, and have them face you. Make sure the lighting is good. You need to see the other person's face clearly.  Think about counseling if you need help to adjust to your hearing loss.  When should you call for help?  Watch closely for changes in your health, and be sure to contact your doctor if:    You think your hearing is getting worse.     You have new symptoms, such as dizziness or nausea.   Where can you learn more?  Go to https://www.healthBuzzient.net/patiented  Enter R798 in the search box to learn more about \"Hearing Loss: Care Instructions.\"  Current as of: September 27, 2023               Content Version: 14.0    3293-1037 [employer].   Care instructions adapted under license by your healthcare " professional. If you have questions about a medical condition or this instruction, always ask your healthcare professional. [employer] disclaims any warranty or liability for your use of this information.

## 2024-08-13 NOTE — PROGRESS NOTES
"Preventive Care Visit  [facility]  Vicky Mclean DO, Internal Medicine  Aug 13, 2024      Assessment & Plan     Encounter for Medicare annual wellness exam    Hyperlipidemia LDL goal <100   - stable, refill provided  - rosuvastatin (CRESTOR) 20 MG tablet; Take 1 tablet (20 mg) by mouth daily  - Basic metabolic panel  (Ca, Cl, CO2, Creat, Gluc, K, Na, BUN); Future  - Lipid panel reflex to direct LDL Fasting; Future    Age-related osteoporosis without current pathological fracture  Check DEXA.  Did not tolerate bisphosphonate, see endo for discussion of other treatment options  - DX Bone Density; Future  - Adult Endocrinology  Referral; Future    Non-allergic rhinitis  Start med.  - azelastine (ASTELIN) 0.1 % nasal spray; Spray 1 spray into both nostrils 2 times daily    Patient has been advised of split billing requirements and indicates understanding: Yes        BMI  Estimated body mass index is 25.03 kg/m  as calculated from the following:    Height as of this encounter: 1.595 m (5' 2.8\").    Weight as of this encounter: 63.7 kg (140 lb 6.4 oz).       Counseling  Appropriate preventive services were addressed with this patient via screening, questionnaire, or discussion as appropriate for fall prevention, nutrition, physical activity, Tobacco-use cessation, weight loss and cognition.  Checklist reviewing preventive services available has been given to the patient.  Reviewed patient's diet, addressing concerns and/or questions.   She is at risk for lack of exercise and has been provided with information to increase physical activity for the benefit of her well-being.   The patient was provided with written information regarding signs of hearing loss.           Subjective   Romana Bee is a 79 year old, presenting for the following:  AWV and Lipids        8/13/2024     8:59 AM   Additional Questions   Roomed by adonay   Accompanied by self         8/13/2024     8:59 AM "   Patient Reported Additional Medications   Patient reports taking the following new medications none         Health Care Directive  Patient has a Health Care Directive on file  Advance care planning document is on file and is current.    HPI      Chief Complaint   Patient presents with    AWV    Lipids     Osteoporosis   -- Had myalgias with bisphosphonate    Rhinitis  --worried about sinus issues  --has daily AM rhinorrhea, sometimes coughing up thick sputum  --no history of environmental allergens  --has seen ENT for this - last in 2022; head CT was normal  --has been diagnosed with eczematous otitis externa of both ears    Trigger finger?  --saw Ortho hand last fall, steroid inj w/out help  --having pain in bilateral hands and wrists    Hyperlipidemia Follow-Up    Are you regularly taking any medication or supplement to lower your cholesterol?   Yes- am  Are you having muscle aches or other side effects that you think could be caused by your cholesterol lowering medication?  No        8/13/2024   General Health   How would you rate your overall physical health? Good   Feel stress (tense, anxious, or unable to sleep) Not at all            8/13/2024   Nutrition   Diet: Regular (no restrictions)            8/13/2024   Exercise   Days per week of moderate/strenous exercise 3 days            8/13/2024   Social Factors   Frequency of gathering with friends or relatives Once a week   Worry food won't last until get money to buy more No   Food not last or not have enough money for food? No   Do you have housing? (Housing is defined as stable permanent housing and does not include staying ouside in a car, in a tent, in an abandoned building, in an overnight shelter, or couch-surfing.) Yes   Are you worried about losing your housing? No   Lack of transportation? No   Unable to get utilities (heat,electricity)? No            8/13/2024   Fall Risk   Fallen 2 or more times in the past year? No   Trouble with walking or  balance? Yes   Gait Speed Test (Document in seconds) 3.84   Gait Speed Test Interpretation Less than or equal to 5.00 seconds - PASS               2024   Activities of Daily Living- Home Safety   Needs help with the following daily activites None of the above   Safety concerns in the home None of the above            2024   Dental   Dentist two times every year? Yes            2024   Hearing Screening   Hearing concerns? (!) IT'S HARD TO FOLLOW A CONVERSATION IN A NOISY RESTAURANT OR CROWDED ROOM.            2024   Driving Risk Screening   Patient/family members have concerns about driving No            2024   General Alertness/Fatigue Screening   Have you been more tired than usual lately? No            2024   Urinary Incontinence Screening   Bothered by leaking urine in past 6 months No            2024   TB Screening   Were you born outside of the [place]? No            Today's PHQ-2 Score:       2024     8:58 AM   PHQ-2 (  [employer])   Q1: Little interest or pleasure in doing things 0   Q2: Feeling down, depressed or hopeless 0   PHQ-2 Score 0   Q1: Little interest or pleasure in doing things Not at all   Q2: Feeling down, depressed or hopeless Not at all   PHQ-2 Score 0           2024   Substance Use   Alcohol more than 3/day or more than 7/wk No   Do you have a current opioid prescription? No   How severe/bad is pain from 1 to 10? 0/10 (No Pain)   Do you use any other substances recreationally? No        Social History     Tobacco Use    Smoking status: Former     Current packs/day: 0.00     Types: Cigarettes     Start date: 1963     Quit date: 1965     Years since quittin.6    Smokeless tobacco: Former    Tobacco comments:     in college    Vaping Use    Vaping status: Never Used   Substance Use Topics    Alcohol use: Yes     Comment: holidays or special occasions     Drug use: Never           2023   LAST FHS-7 RESULTS   1st degree relative breast or  ovarian cancer No   Any relative bilateral breast cancer No   Any male have breast cancer No   Any ONE woman have BOTH breast AND ovarian cancer No   Any woman with breast cancer before 50yrs No   2 or more relatives with breast AND/OR ovarian cancer No   2 or more relatives with breast AND/OR bowel cancer No           Mammogram Screening - After age 74- determine frequency with patient based on health status, life expectancy and patient goals    ASCVD Risk   The 10-year ASCVD risk score (Aric AYON, et al., 2019) is: 22.1%    Values used to calculate the score:      Age: 79 years      Sex: Female      Is Non- : No      Diabetic: No      Tobacco smoker: No      Systolic Blood Pressure: 118 mmHg      Is BP treated: No      HDL Cholesterol: 72 mg/dL      Total Cholesterol: 166 mg/dL            Reviewed and updated as needed this visit by Provider                    Current Outpatient Medications   Medication Sig Dispense Refill    ibuprofen (ADVIL/MOTRIN) 600 MG tablet Take 600 mg by mouth every 6 hours as needed for moderate pain      melatonin 5 MG tablet Take 5 mg by mouth nightly as needed for sleep      Multiple Vitamins-Minerals (PRESERVISION AREDS 2) CAPS Take 2 capsules by mouth daily One in AM one in PM      rosuvastatin (CRESTOR) 20 MG tablet Take 1 tablet (20 mg) by mouth daily 90 tablet 3    VITAMIN D, CHOLECALCIFEROL, PO Take 2,000 Units by mouth daily D3       Current providers sharing in care for this patient include:  Patient Care Team:  Vicky Mclean DO as PCP - General (Internal Medicine)  Vicky Mclean DO as Assigned PCP  Viet Frost MD as MD (Otolaryngology)  Williams Westbrook MD as MD (Dermatology)  Augusto Che MD as Assigned Musculoskeletal Provider  Williams Westbrook MD as Assigned Surgical Provider    The following health maintenance items are reviewed in Epic and correct as of today:  Health Maintenance   Topic  "Date Due    LIPID  08/08/2024    ANNUAL REVIEW OF HM ORDERS  08/08/2024    MEDICARE ANNUAL WELLNESS VISIT  08/08/2024    COVID-19 Vaccine (7 - 2023-24 season) 09/27/2024 (Originally 2/7/2024)    INFLUENZA VACCINE (1) 09/01/2024    DEXA  10/26/2024    GLUCOSE  08/02/2025    FALL RISK ASSESSMENT  08/13/2025    ADVANCE CARE PLANNING  08/08/2028    DTAP/TDAP/TD IMMUNIZATION (3 - Td or Tdap) 04/30/2029    HEPATITIS C SCREENING  Completed    PHQ-2 (once per calendar year)  Completed    Pneumococcal Vaccine: 65+ Years  Completed    ZOSTER IMMUNIZATION  Completed    RSV VACCINE (Pregnancy & 60+)  Completed    IPV IMMUNIZATION  Aged Out    HPV IMMUNIZATION  Aged Out    MENINGITIS IMMUNIZATION  Aged Out    RSV MONOCLONAL ANTIBODY  Aged Out    MAMMO SCREENING  Discontinued    COLORECTAL CANCER SCREENING  Discontinued         Review of Systems  Constitutional, neuro, ENT, endocrine, pulmonary, cardiac, gastrointestinal, genitourinary, musculoskeletal, integument and psychiatric systems are negative, except as otherwise noted.     Objective    Exam  /84 (BP Location: Right arm, Patient Position: Sitting, Cuff Size: Adult Regular)   Pulse 73   Temp 97.9  F (36.6  C) (Tympanic)   Resp 14   Ht 1.595 m (5' 2.8\")   Wt 63.7 kg (140 lb 6.4 oz)   SpO2 98%   Breastfeeding No   BMI 25.03 kg/m     Estimated body mass index is 25.03 kg/m  as calculated from the following:    Height as of this encounter: 1.595 m (5' 2.8\").    Weight as of this encounter: 63.7 kg (140 lb 6.4 oz).    Physical Exam  GENERAL: alert and no distress  EYES: Eyes grossly normal to inspection, PERRL and conjunctivae and sclerae normal  HENT: ear canals and TM's normal, nose and mouth without ulcers or lesions  NECK: no adenopathy, no asymmetry, masses, or scars  RESP: lungs clear to auscultation - no rales, rhonchi or wheezes  CV: regular rate and rhythm, normal S1 S2, no S3 or S4, no murmur, click or rub, no peripheral edema  ABDOMEN: soft, nontender, " no hepatosplenomegaly, no masses and bowel sounds normal  MS: no gross musculoskeletal defects noted, no edema  SKIN: no suspicious lesions or rashes  NEURO: Normal strength and tone, mentation intact and speech normal  PSYCH: mentation appears normal, affect normal/bright         8/13/2024   Mini Cog   Clock Draw Score 2 Normal   3 Item Recall 3 objects recalled   Mini Cog Total Score 5                 Signed Electronically by: Vicky Mclean DO

## 2024-08-15 ENCOUNTER — MYC MEDICAL ADVICE (OUTPATIENT)
Dept: FAMILY MEDICINE | Facility: CLINIC | Age: 79
End: 2024-08-15
Payer: COMMERCIAL

## 2024-09-04 ENCOUNTER — HOSPITAL ENCOUNTER (OUTPATIENT)
Dept: MAMMOGRAPHY | Facility: CLINIC | Age: 79
Discharge: HOME | End: 2024-09-04
Attending: INTERNAL MEDICINE | Admitting: INTERNAL MEDICINE
Payer: COMMERCIAL

## 2024-09-04 DIAGNOSIS — Z12.31 VISIT FOR SCREENING MAMMOGRAM: ICD-10-CM

## 2024-09-04 PROCEDURE — 77063 BREAST TOMOSYNTHESIS BI: CPT

## 2024-09-12 ENCOUNTER — OFFICE VISIT (OUTPATIENT)
Dept: DERMATOLOGY | Facility: CLINIC | Age: 79
End: 2024-09-12
Payer: COMMERCIAL

## 2024-09-12 DIAGNOSIS — L57.0 ACTINIC KERATOSIS: Primary | ICD-10-CM

## 2024-09-12 PROCEDURE — 17000 DESTRUCT PREMALG LESION: CPT | Performed by: PHYSICIAN ASSISTANT

## 2024-09-12 NOTE — LETTER
9/12/2024      Romana Felipe  [address] [facility] [address]      Dear Colleague,    Thank you for referring your patient, Romana Felipe, to the [facility]. Please see a copy of my visit note below.    Patient is here today for biopsy proven actinic keratosis on left superior abdomen  Actinic keratosis on left superior abdomen x 1  LN2:  Treated with LN2 for 5s for 1-2 cycles. Warned risks of blistering, pain, pigment change, scarring, and incomplete resolution.  Advised patient to return if lesions do not completely resolve.  Wound care sheet given.      Again, thank you for allowing me to participate in the care of your patient.        Sincerely,        Nga Iglesias PA-C

## 2024-09-13 NOTE — PROGRESS NOTES
Patient is here today for biopsy proven actinic keratosis on left superior abdomen  Actinic keratosis on left superior abdomen x 1  LN2:  Treated with LN2 for 5s for 1-2 cycles. Warned risks of blistering, pain, pigment change, scarring, and incomplete resolution.  Advised patient to return if lesions do not completely resolve.  Wound care sheet given.

## 2024-09-16 ENCOUNTER — TRANSFERRED RECORDS (OUTPATIENT)
Dept: HEALTH INFORMATION MANAGEMENT | Facility: CLINIC | Age: 79
End: 2024-09-16
Payer: COMMERCIAL

## 2024-10-28 ENCOUNTER — HOSPITAL ENCOUNTER (OUTPATIENT)
Dept: BONE DENSITY | Facility: HOSPITAL | Age: 79
Discharge: HOME | End: 2024-10-28
Attending: INTERNAL MEDICINE | Admitting: INTERNAL MEDICINE
Payer: COMMERCIAL

## 2024-10-28 DIAGNOSIS — M81.0 AGE-RELATED OSTEOPOROSIS WITHOUT CURRENT PATHOLOGICAL FRACTURE: ICD-10-CM

## 2024-10-28 PROCEDURE — 77080 DXA BONE DENSITY AXIAL: CPT

## 2025-01-28 ENCOUNTER — OFFICE VISIT (OUTPATIENT)
Dept: DERMATOLOGY | Facility: CLINIC | Age: 80
End: 2025-01-28
Attending: DERMATOLOGY
Payer: COMMERCIAL

## 2025-01-28 DIAGNOSIS — C44.519 BASAL CELL CARCINOMA (BCC) OF BACK: ICD-10-CM

## 2025-01-28 DIAGNOSIS — L82.1 SEBORRHEIC KERATOSES: ICD-10-CM

## 2025-01-28 DIAGNOSIS — Z85.828 HISTORY OF SKIN CANCER: ICD-10-CM

## 2025-01-28 DIAGNOSIS — D18.01 ANGIOMA OF SKIN: ICD-10-CM

## 2025-01-28 DIAGNOSIS — L81.4 LENTIGO: ICD-10-CM

## 2025-01-28 DIAGNOSIS — D23.9 DERMAL NEVUS: Primary | ICD-10-CM

## 2025-01-28 PROCEDURE — 11602 EXC TR-EXT MAL+MARG 1.1-2 CM: CPT | Performed by: DERMATOLOGY

## 2025-01-28 PROCEDURE — 99213 OFFICE O/P EST LOW 20 MIN: CPT | Mod: 25 | Performed by: DERMATOLOGY

## 2025-01-28 PROCEDURE — 88331 PATH CONSLTJ SURG 1 BLK 1SPC: CPT | Performed by: DERMATOLOGY

## 2025-01-28 PROCEDURE — 11102 TANGNTL BX SKIN SINGLE LES: CPT | Mod: 59 | Performed by: DERMATOLOGY

## 2025-01-28 PROCEDURE — 88332 PATH CONSLTJ SURG EA ADD BLK: CPT | Performed by: DERMATOLOGY

## 2025-01-28 NOTE — PATIENT INSTRUCTIONS
Open Wound Care     for _Right Upper Back _____________  Basal Cell Carcinoma but fully treated today      No strenuous activity for 48 hours    Take Tylenol as needed for discomfort.                                                .         Do not drink alcoholic beverages for 48 hours.    Keep the pressure bandage in place for 24 hours. If the bandage becomes blood tinged or loose, reinforce it with gauze and tape.        (Refer to the reverse side of this page for management of bleeding).    Remove bandage in 24 hours and begin wound care as follows:     Clean area with tap water using a Q tip or gauze pad, (shower / bathe normally)  Dry wound with Q tip or gauze pad  Apply Aquaphor, Vaseline, Polysporin or Bacitracin Ointment with a Q tip  Do NOT use Neosporin Ointment *  Cover the wound with a band-aid or nonstick gauze pad and paper tape.  Repeat wound care once a day until wound is completely healed.    It is an old wives tale that a wound heals better when it is exposed to air and allowed to dry out. The wound will heal faster with a better cosmetic result if it is kept moist with ointment and covered with a bandage.  Do not let the wound dry out.      Supplies Needed:                Qtips or gauze pads                Polysporin or Bacitracin Ointment                Bandaids or nonstick gauze pads and paper tape    Wound care kits and brown paper tape are available for purchase at   the pharmacy.    BLEEDING:    Use tightly rolled up gauze or cloth to apply direct pressure over the bandage for 20   minutes.  Reapply pressure for an additional 20 minutes if necessary  Call the office or go to the nearest emergency room if pressure fails to stop the bleeding.  Use additional gauze and tape to maintain pressure once the bleeding has stopped.  Begin wound care 24 hours after surgery as directed.                  WOUND HEALING    One week after surgery a pink / red halo will form around the outside of the wound.    This is new skin.  The center of the wound will appear yellowish white and produce some drainage.  The pink halo will slowly migrate in toward the center of the wound until the wound is covered with new shiny pink skin.  There will be no more drainage when the wound is completely healed.  It will take six months to one year for the redness to fade.  The scar may be itchy, tight and sensitive to extreme temperatures for a year after the surgery.  Massaging the area several times a day for several minutes after the wound is completely healed will help the scar soften and normalize faster. Begin massage only after healing is complete.      In case of emergency call: Dr Westbrook: [phone]    [facility]: [phone]    [facility]:[phone]         Proper skin care from [facility]:    -Eliminate harsh soaps as they strip the natural oils from the skin, often resulting in dry itchy skin ( i.e. Dial, Zest, Shi Spring)  -Use mild soaps such as Cetaphil or Dove Sensitive Skin in the shower. You do not need to use soap on arms, legs, and trunk every time you shower unless visibly soiled.   -Avoid hot or cold showers.  -After showering, lightly dry off and apply moisturizing within 2-3 minutes. This will help trap moisture in the skin.   -Aggressive use of a moisturizer at least 1-2 times a day to the entire body (including -Vanicream, Cetaphil, Aquaphor or Cerave) and moisturize hands after every washing.  -We recommend using moisturizers that come in a tub that needs to be scooped out, not a pump. This has more of an oil base. It will hold moisture in your skin much better than a water base moisturizer. The above recommended are non-pore clogging.      Wear a sunscreen with at least SPF 30 on your face, ears, neck and V of the chest daily. Wear sunscreen on other areas of the body if those areas are exposed to the sun throughout the day. Sunscreens can contain physical and/or chemical  blockers. Physical blockers are less likely to clog pores, these include zinc oxide and titanium dioxide. Reapply every two hour and after swimming.     Sunscreen examples: https://www.ewg.org/sunscreen/    UV radiation  UVA radiation remains constant throughout the day and throughout the year. It is a longer wavelength than UVB and therefore penetrates deeper into the skin leading to immediate and delayed tanning, photoaging, and skin cancer. 70-80% of UVA and UVB radiation occurs between the hours of 10am-2pm.  UVB radiation  UVB radiation causes the most harmful effects and is more significant during the summer months. However, snow and ice can reflect UVB radiation leading to skin damage during the winter months as well. UVB radiation is responsible for tanning, burning, inflammation, delayed erythema (pinkness), pigmentation (brown spots), and skin cancer.     I recommend self monthly full body exams and yearly full body exams with a dermatology provider. If you develop a new or changing lesion please follow up for examination. Most skin cancers are pink and scaly or pink and pearly. However, we do see blue/brown/black skin cancers.  Consider the ABCDEs of melanoma when giving yourself your monthly full body exam ( don't forget the groin, buttocks, feet, toes, etc). A-asymmetry, B-borders, C-color, D-diameter, E-elevation or evolving. If you see any of these changes please follow up in clinic. If you cannot see your back I recommend purchasing a hand held mirror to use with a larger wall mirror.       Checking for Skin Cancer  You can find cancer early by checking your skin each month. There are 3 kinds of skin cancer. They are melanoma, basal cell carcinoma, and squamous cell carcinoma. Doing monthly skin checks is the best way to find new marks or skin changes. Follow the instructions below for checking your skin.   The ABCDEs of checking moles for melanoma   Check your moles or growths for signs of melanoma  using ABCDE:   Asymmetry: the sides of the mole or growth don t match  Border: the edges are ragged, notched, or blurred  Color: the color within the mole or growth varies  Diameter: the mole or growth is larger than 6 mm (size of a pencil eraser)  Evolving: the size, shape, or color of the mole or growth is changing (evolving is not shown in the images below)    Checking for other types of skin cancer  Basal cell carcinoma or squamous cell carcinoma have symptoms such as:     A spot or mole that looks different from all other marks on your skin  Changes in how an area feels, such as itching, tenderness, or pain  Changes in the skin's surface, such as oozing, bleeding, or scaliness  A sore that does not heal  New swelling or redness beyond the border of a mole    Who s at risk?  Anyone can get skin cancer. But you are at greater risk if you have:   Fair skin, light-colored hair, or light-colored eyes  Many moles or abnormal moles on your skin  A history of sunburns from sunlight or tanning beds  A family history of skin cancer  A history of exposure to radiation or chemicals  A weakened immune system  If you have had skin cancer in the past, you are at risk for recurring skin cancer.   How to check your skin  Do your monthly skin checkups in front of a full-length mirror. Check all parts of your body, including your:   Head (ears, face, neck, and scalp)  Torso (front, back, and sides)  Arms (tops, undersides, upper, and lower armpits)  Hands (palms, backs, and fingers, including under the nails)  Buttocks and genitals  Legs (front, back, and sides)  Feet (tops, soles, toes, including under the nails, and between toes)  If you have a lot of moles, take digital photos of them each month. Make sure to take photos both up close and from a distance. These can help you see if any moles change over time.   Most skin changes are not cancer. But if you see any changes in your skin, call your doctor right away. Only he or she  can diagnose a problem. If you have skin cancer, seeing your doctor can be the first step toward getting the treatment that could save your life.   [employer] last reviewed this educational content on 4/1/2019 2000-2020 [employer] [address] [place] [address], [place] [address]. All rights reserved. This information is not intended as a substitute for professional medical care. Always follow your healthcare professional's instructions.       When should I call my doctor?  If you are worsening or not improving, please, contact us or seek urgent care as noted below.     Who should I call with questions (adults)?    [facility] [phone]  For urgent needs outside of business hours call the [facility] at [phone] and ask for the dermatology resident on call to be paged  If this is a medical emergency and you are unable to reach an ER, Call [phone]      If you need a prescription refill, please contact your pharmacy. Refills are approved or denied by our Physicians during normal business hours, Monday through Friday.  Per office policy, refills will not be granted if you have not been seen within the past year (or sooner depending on the condition).

## 2025-01-28 NOTE — LETTER
2025      Romana Felipe  [address] [facility] [address]      Dear Colleague,    Thank you for referring your patient, Romana Felipe, to the [facility]. Please see a copy of my visit note below.    Romana Felipe is an extremely pleasant 79 year old year old female patient here today for hx of non-melanoma skin cancer.  Patient has no other skin complaints today.  Remainder of the HPI, Meds, PMH, Allergies, FH, and SH was reviewed in chart.      Past Medical History:   Diagnosis Date     Basal cell carcinoma        Past Surgical History:   Procedure Laterality Date     APPENDECTOMY       BLADDER SUSPENSION       HYSTERECTOMY       HYSTERECTOMY       OOPHORECTOMY       TOTAL HIP ARTHROPLASTY Left      TOTAL KNEE ARTHROPLASTY Right         Family History   Problem Relation Age of Onset     Dementia Mother      Myocardial Infarction Father      No Known Problems Sister      No Known Problems Daughter      No Known Problems Maternal Grandmother      No Known Problems Maternal Grandfather      No Known Problems Paternal Grandmother      No Known Problems Paternal Grandfather      No Known Problems Maternal Aunt      No Known Problems Paternal Aunt      Hereditary Breast and Ovarian Cancer Syndrome No family hx of      Breast Cancer No family hx of      Cancer No family hx of      Colon Cancer No family hx of      Endometrial Cancer No family hx of      Ovarian Cancer No family hx of        Social History     Socioeconomic History     Marital status:      Spouse name: Not on file     Number of children: Not on file     Years of education: Not on file     Highest education level: Not on file   Occupational History     Not on file   Tobacco Use     Smoking status: Former     Current packs/day: 0.00     Types: Cigarettes     Start date: 1963     Quit date: 1965     Years since quittin.1     Smokeless tobacco: Former     Tobacco comments:     in college    Vaping  Use     Vaping status: Never Used   Substance and Sexual Activity     Alcohol use: Yes     Comment: holidays or special occasions      Drug use: Never     Sexual activity: Not Currently   Other Topics Concern     Not on file   Social History Narrative     Not on file     Social Drivers of Health     Financial Resource Strain: Low Risk  (8/13/2024)    Financial Resource Strain      Within the past 12 months, have you or your family members you live with been unable to get utilities (heat, electricity) when it was really needed?: No   Food Insecurity: Low Risk  (8/13/2024)    Food Insecurity      Within the past 12 months, did you worry that your food would run out before you got money to buy more?: No      Within the past 12 months, did the food you bought just not last and you didn t have money to get more?: No   Transportation Needs: Low Risk  (8/13/2024)    Transportation Needs      Within the past 12 months, has lack of transportation kept you from medical appointments, getting your medicines, non-medical meetings or appointments, work, or from getting things that you need?: No   Physical Activity: Unknown (8/13/2024)    Exercise Vital Sign      Days of Exercise per Week: 3 days      Minutes of Exercise per Session: Not on file   Stress: No Stress Concern Present (8/13/2024)    [employer] - Occupational Stress Questionnaire      Feeling of Stress : Not at all   Social Connections: Unknown (8/13/2024)    Social Connection and Isolation Panel [NHANES]      Frequency of Communication with Friends and Family: Not on file      Frequency of Social Gatherings with Friends and Family: Once a week      Attends Congregational Services: Not on file      Active Member of Clubs or Organizations: Not on file      Attends Club or Organization Meetings: Not on file      Marital Status: Not on file   Interpersonal Safety: Low Risk  (8/13/2024)    Interpersonal Safety      Do you feel physically and  emotionally safe where you currently live?: Yes      Within the past 12 months, have you been hit, slapped, kicked or otherwise physically hurt by someone?: No      Within the past 12 months, have you been humiliated or emotionally abused in other ways by your partner or ex-partner?: No   Housing Stability: Low Risk  (8/13/2024)    Housing Stability      Do you have housing? : Yes      Are you worried about losing your housing?: No       Outpatient Encounter Medications as of 1/28/2025   Medication Sig Dispense Refill     azelastine (ASTELIN) 0.1 % nasal spray Spray 1 spray into both nostrils 2 times daily 30 mL 11     ibuprofen (ADVIL/MOTRIN) 600 MG tablet Take 600 mg by mouth every 6 hours as needed for moderate pain       melatonin 5 MG tablet Take 5 mg by mouth nightly as needed for sleep       Multiple Vitamins-Minerals (PRESERVISION AREDS 2) CAPS Take 2 capsules by mouth daily One in AM one in PM       rosuvastatin (CRESTOR) 20 MG tablet Take 1 tablet (20 mg) by mouth daily 90 tablet 3     VITAMIN D, CHOLECALCIFEROL, PO Take 2,000 Units by mouth daily D3       No facility-administered encounter medications on file as of 1/28/2025.             O:   NAD, WDWN, Alert & Oriented, Mood & Affect wnl, Vitals stable   General appearance normal   Vitals stable   Alert, oriented and in no acute distress     R upper back 7mm pink pearly papule    Stuck on papules and brown macules on trunk and ext   Red papules on trunk  Flesh colored papules on trunk     The remainder of the full exam was normal; the following areas were examined:  conjunctiva/lids, , neck, peripheral vascular system, abdomen, lymph nodes, digits/nails, eccrine and apocrine glands, scalp/hair, face, neck, chest, abdomen, buttocks, back, RUE, LUE, RLE, LLE       Eyes: Conjunctivae/lids:Normal     ENT: Lips, mucosa: normal    MSK:Normal    Cardiovascular: peripheral edema none    Pulm: Breathing Normal    Lymph Nodes: No Head and Neck Lymphadenopathy      Neuro/Psych: Orientation:Alert and Orientedx3 ; Mood/Affect:normal       MICRO:     R upper back :Orthokeratosis of epidermis with a proliferation of nests of basaloid cells, with peripheral palisading and a haphazard arrangement in the center extending into the dermis, forming nodules.  The tumor cells have hyperchromatic nuclei. Poor cytoplasm and intercellular bridging.    R upper back excision :Unremarkable epidermis, dermis and superficial subcutis.  No concerning areas for malignancy.       A/P:  1. Seborrheic keratosis, lentigo, angioma, dermal nevus, hx of non-melanoma skin cancer   2. R upper back r.o basal cell carcinoma  TANGENTIAL BIOPSY IN HOUSE:  After consent, anesthesia with LEC and prep, tangential excision performed and dx above confirmed with frozen section histology.  No complications and routine wound care.  Patient is not on  anticoagulants and risk of bleeding discussed with patient.       I have personally reviewed all specimens and/or slides and used them with my medical judgement to determine or confirm the final diagnosis.     Patient told result basal cell carcinoma .      It was a pleasure speaking to Romana Felipe today.  Previous clinic notes and pertinent laboratory tests were reviewed prior to Romana Felipe's visit.  Nature and genetics of benign skin lesions dicussed with patient.  Signs and Symptoms of skin cancer discussed with patient.  Patient encouraged to perform monthly skin exams.  UV precautions reviewed with patient.  Risks of non-melanoma skin cancer discussed with patient   Return to clinic 6 months  PROCEDURE NOTE  R upper back basal cell carcinoma   EXCISION OF BASAL CELL CARCINOMA, Margins confirmed with FROZEN SECTIONS AND Second intent: After thorough discussion of PGACAC, consent obtained, anesthesia and prep, the margins of the lesion were identified and an incision was made encompassing the lesion with 3mm margin. The incisions were made through the  skin and down to and including the superficial subcutis.  The lesion was removed en bloc and submitted for frozen section pathologic review. Clear margins obtained (1.3cm).    REPAIR SECOND INTENT: We discussed the options for wound management in full with the patient including risks/benefits/possible outcomes. Decision made to allow the wound to heal by second intention. EBL minimal; complications none; wound care routine.  The patient was discharged in good condition and will return in one month or prn for wound evaluation.       Again, thank you for allowing me to participate in the care of your patient.        Sincerely,        Williams Westbrook MD    Electronically signed

## 2025-01-28 NOTE — PROGRESS NOTES
Romana Felipe is an extremely pleasant 79 year old year old female patient here today for hx of non-melanoma skin cancer.  Patient has no other skin complaints today.  Remainder of the HPI, Meds, PMH, Allergies, FH, and SH was reviewed in chart.      Past Medical History:   Diagnosis Date    Basal cell carcinoma        Past Surgical History:   Procedure Laterality Date    APPENDECTOMY      BLADDER SUSPENSION      HYSTERECTOMY      HYSTERECTOMY      OOPHORECTOMY      TOTAL HIP ARTHROPLASTY Left     TOTAL KNEE ARTHROPLASTY Right         Family History   Problem Relation Age of Onset    Dementia Mother     Myocardial Infarction Father     No Known Problems Sister     No Known Problems Daughter     No Known Problems Maternal Grandmother     No Known Problems Maternal Grandfather     No Known Problems Paternal Grandmother     No Known Problems Paternal Grandfather     No Known Problems Maternal Aunt     No Known Problems Paternal Aunt     Hereditary Breast and Ovarian Cancer Syndrome No family hx of     Breast Cancer No family hx of     Cancer No family hx of     Colon Cancer No family hx of     Endometrial Cancer No family hx of     Ovarian Cancer No family hx of        Social History     Socioeconomic History    Marital status:      Spouse name: Not on file    Number of children: Not on file    Years of education: Not on file    Highest education level: Not on file   Occupational History    Not on file   Tobacco Use    Smoking status: Former     Current packs/day: 0.00     Types: Cigarettes     Start date: 1963     Quit date: 1965     Years since quittin.1    Smokeless tobacco: Former    Tobacco comments:     in college    Vaping Use    Vaping status: Never Used   Substance and Sexual Activity    Alcohol use: Yes     Comment: holidays or special occasions     Drug use: Never    Sexual activity: Not Currently   Other Topics Concern    Not on file   Social History Narrative    Not on file      Social Drivers of Health     Financial Resource Strain: Low Risk  (8/13/2024)    Financial Resource Strain     Within the past 12 months, have you or your family members you live with been unable to get utilities (heat, electricity) when it was really needed?: No   Food Insecurity: Low Risk  (8/13/2024)    Food Insecurity     Within the past 12 months, did you worry that your food would run out before you got money to buy more?: No     Within the past 12 months, did the food you bought just not last and you didn t have money to get more?: No   Transportation Needs: Low Risk  (8/13/2024)    Transportation Needs     Within the past 12 months, has lack of transportation kept you from medical appointments, getting your medicines, non-medical meetings or appointments, work, or from getting things that you need?: No   Physical Activity: Unknown (8/13/2024)    Exercise Vital Sign     Days of Exercise per Week: 3 days     Minutes of Exercise per Session: Not on file   Stress: No Stress Concern Present (8/13/2024)    [employer] - Occupational Stress Questionnaire     Feeling of Stress : Not at all   Social Connections: Unknown (8/13/2024)    Social Connection and Isolation Panel [NHANES]     Frequency of Communication with Friends and Family: Not on file     Frequency of Social Gatherings with Friends and Family: Once a week     Attends Protestant Services: Not on file     Active Member of Clubs or Organizations: Not on file     Attends Club or Organization Meetings: Not on file     Marital Status: Not on file   Interpersonal Safety: Low Risk  (8/13/2024)    Interpersonal Safety     Do you feel physically and emotionally safe where you currently live?: Yes     Within the past 12 months, have you been hit, slapped, kicked or otherwise physically hurt by someone?: No     Within the past 12 months, have you been humiliated or emotionally abused in other ways by your partner or ex-partner?: No    Housing Stability: Low Risk  (8/13/2024)    Housing Stability     Do you have housing? : Yes     Are you worried about losing your housing?: No       Outpatient Encounter Medications as of 1/28/2025   Medication Sig Dispense Refill    azelastine (ASTELIN) 0.1 % nasal spray Spray 1 spray into both nostrils 2 times daily 30 mL 11    ibuprofen (ADVIL/MOTRIN) 600 MG tablet Take 600 mg by mouth every 6 hours as needed for moderate pain      melatonin 5 MG tablet Take 5 mg by mouth nightly as needed for sleep      Multiple Vitamins-Minerals (PRESERVISION AREDS 2) CAPS Take 2 capsules by mouth daily One in AM one in PM      rosuvastatin (CRESTOR) 20 MG tablet Take 1 tablet (20 mg) by mouth daily 90 tablet 3    VITAMIN D, CHOLECALCIFEROL, PO Take 2,000 Units by mouth daily D3       No facility-administered encounter medications on file as of 1/28/2025.             O:   NAD, WDWN, Alert & Oriented, Mood & Affect wnl, Vitals stable   General appearance normal   Vitals stable   Alert, oriented and in no acute distress     R upper back 7mm pink pearly papule    Stuck on papules and brown macules on trunk and ext   Red papules on trunk  Flesh colored papules on trunk     The remainder of the full exam was normal; the following areas were examined:  conjunctiva/lids, , neck, peripheral vascular system, abdomen, lymph nodes, digits/nails, eccrine and apocrine glands, scalp/hair, face, neck, chest, abdomen, buttocks, back, RUE, LUE, RLE, LLE       Eyes: Conjunctivae/lids:Normal     ENT: Lips, mucosa: normal    MSK:Normal    Cardiovascular: peripheral edema none    Pulm: Breathing Normal    Lymph Nodes: No Head and Neck Lymphadenopathy     Neuro/Psych: Orientation:Alert and Orientedx3 ; Mood/Affect:normal       MICRO:     R upper back :Orthokeratosis of epidermis with a proliferation of nests of basaloid cells, with peripheral palisading and a haphazard arrangement in the center extending into the dermis, forming nodules.  The  tumor cells have hyperchromatic nuclei. Poor cytoplasm and intercellular bridging.    R upper back excision :Unremarkable epidermis, dermis and superficial subcutis.  No concerning areas for malignancy.       A/P:  1. Seborrheic keratosis, lentigo, angioma, dermal nevus, hx of non-melanoma skin cancer   2. R upper back r.o basal cell carcinoma  TANGENTIAL BIOPSY IN HOUSE:  After consent, anesthesia with LEC and prep, tangential excision performed and dx above confirmed with frozen section histology.  No complications and routine wound care.  Patient is not on  anticoagulants and risk of bleeding discussed with patient.       I have personally reviewed all specimens and/or slides and used them with my medical judgement to determine or confirm the final diagnosis.     Patient told result basal cell carcinoma .      It was a pleasure speaking to Romana Felipe today.  Previous clinic notes and pertinent laboratory tests were reviewed prior to Romana Felipe's visit.  Nature and genetics of benign skin lesions dicussed with patient.  Signs and Symptoms of skin cancer discussed with patient.  Patient encouraged to perform monthly skin exams.  UV precautions reviewed with patient.  Risks of non-melanoma skin cancer discussed with patient   Return to clinic 6 months  PROCEDURE NOTE  R upper back basal cell carcinoma   EXCISION OF BASAL CELL CARCINOMA, Margins confirmed with FROZEN SECTIONS AND Second intent: After thorough discussion of PGACAC, consent obtained, anesthesia and prep, the margins of the lesion were identified and an incision was made encompassing the lesion with 3mm margin. The incisions were made through the skin and down to and including the superficial subcutis.  The lesion was removed en bloc and submitted for frozen section pathologic review. Clear margins obtained (1.3cm).    REPAIR SECOND INTENT: We discussed the options for wound management in full with the patient including  risks/benefits/possible outcomes. Decision made to allow the wound to heal by second intention. EBL minimal; complications none; wound care routine.  The patient was discharged in good condition and will return in one month or prn for wound evaluation.

## 2025-05-04 ENCOUNTER — TELEPHONE (OUTPATIENT)
Dept: SCHEDULING | Facility: CLINIC | Age: 80
End: 2025-05-04
Payer: COMMERCIAL

## 2025-05-04 NOTE — TELEPHONE ENCOUNTER
Reason for Call:  Appointment Request    Patient requesting this type of appt:  Pain on left leg.      Requested provider: Vicky Mclean    Reason patient unable to be scheduled: Not within requested timeframe    When does patient want to be seen/preferred time: Same day    Comments: Looking to be seen anytime this upcoming week.     [facility] or would you prefer to receive a phone call?:   Patient would prefer a phone call   Okay to leave a detailed message?: Yes at Home number on file [phone] (home)    Call taken on 5/4/2025 at 4:21 PM by Rukhsana Shin

## 2025-05-06 NOTE — TELEPHONE ENCOUNTER
Romana Bee states that her left leg has been giving out on her for the past 15-20 years but has been getting much worse recently. For the past 15-20 years it has been a mild jerk type reaction that others can notice but does not make her fall. Thursday it was a moderate type jerk and Saturday was very severe but was keshawn enough to catch herself on the counter. She does not have any pain with this and has not happened since then. States it feels like it starts in her hip and then goes down into her knee, feels like she over stretched it and all happens in about 10 seconds. She does have an artificial hip on that side but was seen by an ortho provider last year and told her it looked good. She did have this problem before the artificial hip. I scheduled her to see Theresa Dallas tomorrow morning.    Amanda Kim RN

## 2025-05-07 ENCOUNTER — HOSPITAL ENCOUNTER (OUTPATIENT)
Dept: MRI IMAGING | Facility: CLINIC | Age: 80
Discharge: HOME | End: 2025-05-07
Attending: NURSE PRACTITIONER
Payer: COMMERCIAL

## 2025-05-07 ENCOUNTER — OFFICE VISIT (OUTPATIENT)
Dept: FAMILY MEDICINE | Facility: CLINIC | Age: 80
End: 2025-05-07
Payer: COMMERCIAL

## 2025-05-07 VITALS
SYSTOLIC BLOOD PRESSURE: 139 MMHG | DIASTOLIC BLOOD PRESSURE: 76 MMHG | RESPIRATION RATE: 24 BRPM | TEMPERATURE: 97.3 F | HEIGHT: 63 IN | WEIGHT: 140.1 LBS | BODY MASS INDEX: 24.82 KG/M2 | HEART RATE: 78 BPM | OXYGEN SATURATION: 95 %

## 2025-05-07 DIAGNOSIS — G89.29 CHRONIC BILATERAL LOW BACK PAIN WITHOUT SCIATICA: ICD-10-CM

## 2025-05-07 DIAGNOSIS — R29.898 LEFT LEG WEAKNESS: ICD-10-CM

## 2025-05-07 DIAGNOSIS — R29.898 LEFT LEG WEAKNESS: Primary | ICD-10-CM

## 2025-05-07 DIAGNOSIS — M54.50 CHRONIC BILATERAL LOW BACK PAIN WITHOUT SCIATICA: ICD-10-CM

## 2025-05-07 PROCEDURE — 3078F DIAST BP <80 MM HG: CPT | Performed by: NURSE PRACTITIONER

## 2025-05-07 PROCEDURE — 1126F AMNT PAIN NOTED NONE PRSNT: CPT | Performed by: NURSE PRACTITIONER

## 2025-05-07 PROCEDURE — 99214 OFFICE O/P EST MOD 30 MIN: CPT | Performed by: NURSE PRACTITIONER

## 2025-05-07 PROCEDURE — 72148 MRI LUMBAR SPINE W/O DYE: CPT

## 2025-05-07 PROCEDURE — 3075F SYST BP GE 130 - 139MM HG: CPT | Performed by: NURSE PRACTITIONER

## 2025-05-07 ASSESSMENT — PAIN SCALES - GENERAL: PAINLEVEL_OUTOF10: NO PAIN (0)

## 2025-05-07 NOTE — PROGRESS NOTES
"  Assessment & Plan     Left leg weakness  Chronic bilateral low back pain without sciatica  See HPI for further details. Does have some left leg weakness with worsened symptoms over past few days. No chronic steroid use, no recent trauma, no IVDU, no history of malignancy, no focal neurological deficit, no saddle anesthesia, incontinence or bowel or bladder. Will obtain MRI report from last year with [facility], but will update MRI given new symptoms, specifically the leg weakness. If unremarkable, will refer back to [facility] for further evaluation.  - MR Lumbar Spine w/o Contrast; Future    Follow-up  No follow-ups on file.    Subjective   Romana Bee is a 80 year old, presenting for the following health issues:  Leg Problem (Left leg giving out on her while walking)        5/7/2025     9:04 AM   Additional Questions   Roomed by NELL Dutton CMA   Accompanied by Self     History of Present Illness       Reason for visit:  Left leg gives in  Symptom onset:  More than a month  Symptoms include:  Left leg gives in  Symptom intensity:  Severe  Symptom progression:  Worsening  Had these symptoms before:  Yes  Has tried/received treatment for these symptoms:  No  What makes it worse:  ?  What makes it better:  ?   She is taking medications regularly.        For many years, reports that she will sporadically get a sensation originating from the left hip radiating down the anterior thigh. This causes her to \"jerk\" forward due to the sensation. Typically, this happens once every ~6 weeks and is \"mild\" per her reports. This occurred on 5/1 and she notes was more severe than normal, then again on 5/3 causing her to fall forward as her leg \"gave out\". She was able to catch herself on a counter top, did not fall to the floor. She feels a \"tight, fatigued\" sensation to the anterior thigh all the time. No paresthesias, saddle anesthesia, loss of bowel or bladder control. Does feel the left leg is generally weaker. Is causing her to fear walking " "when she is not close to a counter or wall. Does have a history of scoliosis as well as chronic bilateral low back pain. She reports she saw a physician with [facility] last year for a right IT band issue, and had an MRI with [facility] which she reports was normal, however no record available in Epic. Hx right TKR ~20 years ago and left THR ~10 years ago. She notes she had XR of both of these joints at [facility] last year and was told these were normal. Denies current left hip or knee pain.      Review of Systems  Constitutional, HEENT, cardiovascular, pulmonary, GI, , musculoskeletal, neuro, skin, endocrine and psych systems are negative, except as otherwise noted.      Objective    BP (!) 148/78   Pulse 78   Temp 97.3  F (36.3  C) (Oral)   Resp 24   Ht 1.6 m (5' 3\")   Wt 63.5 kg (140 lb 1.6 oz)   SpO2 95%   BMI 24.82 kg/m    Body mass index is 24.82 kg/m .  Physical Exam   General Appearance:  Alert, cooperative, no distress, appears stated age. Afebrile. Appears comfortable sitting in chair. Rises from seated position without difficulty.  Integument: Warm, dry, no rashes or lesions.  HEENT: Atraumatic, normocephalic. Face nontraumatic. Conjunctiva clear, Lids normal.  Neck: Supple, no meningismus. No Cspine tenderness. Neck ROM intact.  Respiratory: No distress.   Cardiovascular: Regular rate  Abdomen: soft, nontender  Musculoskeletal: Back: No Lspine or Tspine tenderness or crepitus to palpation. Mild TTP bilateral lumbar paraspinal musculature. Strength testing: hip flexion, extension 5/5 right, 4/5 left, knee flexion/extension 5/5 bilaterally, ankle plantar/dorsiflexion 5/5 bilaterally.  Straight leg raise negative. Gait: observed, no antalgia or abnormalities. Steady gait. 2+ bilateral pedal pulses.  Normal toe raise, heel walk. Normal flexion and extension of toes.  Neurologic: Alert and orientated appropriately. No focal deficits. Sensation intact in distal LEs. DTRs: patellar 2+ bilaterally, achilles 2+ " bilaterally.  Psych: Normal mood and affect.              Signed Electronically by: QUIANA Whipple CNP

## 2025-05-07 NOTE — PATIENT INSTRUCTIONS
I will get the records from [facility].  To schedule MRI, call [phone].

## 2025-05-09 ENCOUNTER — RESULTS FOLLOW-UP (OUTPATIENT)
Dept: FAMILY MEDICINE | Facility: CLINIC | Age: 80
End: 2025-05-09
Payer: COMMERCIAL

## 2025-05-09 DIAGNOSIS — M25.552 HIP PAIN, LEFT: ICD-10-CM

## 2025-05-09 DIAGNOSIS — K76.9 LIVER LESION: Primary | ICD-10-CM

## 2025-05-14 ENCOUNTER — HOSPITAL ENCOUNTER (OUTPATIENT)
Dept: ULTRASOUND IMAGING | Facility: CLINIC | Age: 80
Discharge: HOME | End: 2025-05-14
Attending: NURSE PRACTITIONER
Payer: COMMERCIAL

## 2025-05-14 DIAGNOSIS — K76.9 LIVER LESION: ICD-10-CM

## 2025-05-14 PROCEDURE — 76705 ECHO EXAM OF ABDOMEN: CPT

## 2025-05-14 NOTE — TELEPHONE ENCOUNTER
Theresa  See new mychart. Should pt wait for Dr Monroy to order additional imaging?      Geovani DASH RN

## 2025-05-15 ENCOUNTER — RESULTS FOLLOW-UP (OUTPATIENT)
Dept: FAMILY MEDICINE | Facility: CLINIC | Age: 80
End: 2025-05-15

## 2025-05-16 ENCOUNTER — ANCILLARY PROCEDURE (OUTPATIENT)
Dept: GENERAL RADIOLOGY | Facility: CLINIC | Age: 80
End: 2025-05-16
Attending: NURSE PRACTITIONER
Payer: COMMERCIAL

## 2025-05-16 DIAGNOSIS — M25.552 HIP PAIN, LEFT: ICD-10-CM

## 2025-05-16 PROCEDURE — 73502 X-RAY EXAM HIP UNI 2-3 VIEWS: CPT | Mod: TC | Performed by: STUDENT IN AN ORGANIZED HEALTH CARE EDUCATION/TRAINING PROGRAM

## 2025-05-19 ENCOUNTER — RESULTS FOLLOW-UP (OUTPATIENT)
Dept: PEDIATRICS | Facility: CLINIC | Age: 80
End: 2025-05-19

## 2025-06-24 ENCOUNTER — OFFICE VISIT (OUTPATIENT)
Dept: FAMILY MEDICINE | Facility: CLINIC | Age: 80
End: 2025-06-24
Payer: COMMERCIAL

## 2025-06-24 VITALS
WEIGHT: 138.2 LBS | RESPIRATION RATE: 12 BRPM | HEIGHT: 63 IN | DIASTOLIC BLOOD PRESSURE: 91 MMHG | HEART RATE: 75 BPM | SYSTOLIC BLOOD PRESSURE: 152 MMHG | OXYGEN SATURATION: 97 % | TEMPERATURE: 97.1 F | BODY MASS INDEX: 24.49 KG/M2

## 2025-06-24 DIAGNOSIS — M41.26 OTHER IDIOPATHIC SCOLIOSIS, LUMBAR REGION: Primary | ICD-10-CM

## 2025-06-24 DIAGNOSIS — Z23 NEED FOR VACCINATION: ICD-10-CM

## 2025-06-24 PROCEDURE — 3079F DIAST BP 80-89 MM HG: CPT | Performed by: INTERNAL MEDICINE

## 2025-06-24 PROCEDURE — 1125F AMNT PAIN NOTED PAIN PRSNT: CPT | Performed by: INTERNAL MEDICINE

## 2025-06-24 PROCEDURE — 90480 ADMN SARSCOV2 VAC 1/ONLY CMP: CPT | Performed by: INTERNAL MEDICINE

## 2025-06-24 PROCEDURE — G2211 COMPLEX E/M VISIT ADD ON: HCPCS | Performed by: INTERNAL MEDICINE

## 2025-06-24 PROCEDURE — 91320 SARSCV2 VAC 30MCG TRS-SUC IM: CPT | Performed by: INTERNAL MEDICINE

## 2025-06-24 PROCEDURE — 99213 OFFICE O/P EST LOW 20 MIN: CPT | Performed by: INTERNAL MEDICINE

## 2025-06-24 PROCEDURE — 3077F SYST BP >= 140 MM HG: CPT | Performed by: INTERNAL MEDICINE

## 2025-06-24 ASSESSMENT — PAIN SCALES - GENERAL: PAINLEVEL_OUTOF10: SEVERE PAIN (7)

## 2025-06-24 NOTE — PROGRESS NOTES
Assessment & Plan   Problem List Items Addressed This Visit       Scoliosis - Primary    Relevant Orders    Spine  Referral    Physical Therapy  Referral     Other Visit Diagnoses         Need for vaccination        Relevant Orders    COVID-19 12+ (PFIZER) (Completed)           Back and Hip symptoms  Continue with Chiropractor, massage  Recommend gentle techniques due to osteoporosis    Recommend physical therapy for the scoliosis, low back, left hip area  Referral to Spine Doctor           Froy   Romana Bee is a 80 year old, presenting for the following health issues:  Follow Up (Hip xray looks ok. If you prefer, you could also follow up with Dr. Mclean to see if she has additional thoughts on your symptoms.//You have some degenerative changes as well as some narrowing throughout the spine, but I'm not sure there's anything here that explains the symptoms you're having. As we discussed Wednesday, I think you should discuss with your orthopedic provider.there was a lesion noted on your liver on the MRI that the radiologist has recommended we get a dedicated ultrasound of. / /)        6/24/2025     9:55 AM   Additional Questions   Roomed by adonay   Accompanied by self         6/24/2025     9:55 AM   Patient Reported Additional Medications   Patient reports taking the following new medications na     History of Present Illness       Back Pain:  She presents for follow up of back pain. Patient's back pain is a chronic problem.  Location of back pain:  Right lower back, left lower back and other  Description of back pain: dull ache and sharp  Back pain spreads: nowhere    Since patient first noticed back pain, pain is: gradually worsening  Does back pain interfere with her job:  No       Reason for visit:  Left leg releases    She eats 4 or more servings of fruits and vegetables daily.She consumes 0 sweetened beverage(s) daily.She exercises with enough effort to increase her heart rate 9 or  "less minutes per day.  She exercises with enough effort to increase her heart rate 7 days per week.   She is taking medications regularly.        Chief Complaint   Patient presents with    Follow Up     Hip xray looks ok. If you prefer, you could also follow up with Dr. Mclean to see if she has additional thoughts on your symptoms.    You have some degenerative changes as well as some narrowing throughout the spine, but I'm not sure there's anything here that explains the symptoms you're having. As we discussed Wednesday, I think you should discuss with your orthopedic provider.there was a lesion noted on your liver on the MRI that the radiologist has recommended we get a dedicated ultrasound of.           Leg weakness/Pain  --reports pain in left hip that occurs while walking, calls it a 'release' - feels like leg will give out  --started keeping track of episodes, occurring ~3 x month  --[facility]  --is seeing chrio monthly; doing exercises recommended by chiro;  doing these for 2-3 months and this also seems to help  --bought a back brace online and this seems to help  --massage therapist said her psoas, gluteus medius, max, QL are all very tight.  --doesn't have pain in left hip or back except during these very brief 'release' episodes, lasting a few seconds  --has low back fatigue at end of the day  --massage and chiro help for 1-2 days  --chiro is aware of osteoporosis , doesn't do HVLA techniques         --was seen on 5/7 by my partner for this    For many years, reports that she will sporadically get a sensation originating from the left hip radiating down the anterior thigh. This causes her to \"jerk\" forward due to the sensation. Typically, this happens once every ~6 weeks and is \"mild\" per her reports. This occurred on 5/1 and she notes was more severe than normal, then again on 5/3 causing her to fall forward as her leg \"gave out\". She was " "able to catch herself on a counter top, did not fall to the floor. She feels a \"tight, fatigued\" sensation to the anterior thigh all the time. No paresthesias, saddle anesthesia, loss of bowel or bladder control. Does feel the left leg is generally weaker. Is causing her to fear walking when she is not close to a counter or wall. Does have a history of scoliosis as well as chronic bilateral low back pain. She reports she saw a physician with [facility] last year for a right IT band issue, and had an MRI with [facility] which she reports was normal, however no record available in Epic. Hx right TKR ~20 years ago and left THR ~10 years ago. She notes she had XR of both of these joints at [facility] last year and was told these were normal. Denies current left hip or knee pain.   --my partner ordered MRI of the lumbar psine and Xray of the left hip       MRI  IMPRESSION:  1.  Multilevel degenerative changes with some progression since the prior examination, as described.  2.  Levoconvex lumbar scoliosis.  3.  Mild spinal canal stenosis at L2-L3 and mild to moderate spinal canal stenosis at L3-L4.  4.  Mild/mild to moderate multilevel neural foraminal stenosis.  5.  Nonspecific ovoid T2 intermediate lesion in the right hepatic lobe measuring approximately 17 mm. Recommend ultrasound for further evaluation.    Xray left hip  IMPRESSION: Left total hip arthroplasty with normal alignment. No evidence of loosening or fracture. Degenerative changes of the lower lumbar spine with scoliotic curvature.     Ultrasound Liver  IMPRESSION:  1.  Benign right hepatic lobe cyst and hemangioma. One of these likely represents the MRI findings.  2.  Small volume biliary sludge.  3.  Questionable sub-5 mm gallbladder polyp. No specific follow-up required.    Current Outpatient Medications   Medication Sig Dispense Refill    ibuprofen (ADVIL/MOTRIN) 600 MG tablet Take 600 mg by mouth every 6 hours as needed for moderate pain      Multiple Vitamin (MULTI " "VITAMIN) TABS Take 1 tablet by mouth daily.      Multiple Vitamins-Minerals (PRESERVISION AREDS 2) CAPS Take 2 capsules by mouth daily One in AM one in PM      rosuvastatin (CRESTOR) 20 MG tablet Take 1 tablet (20 mg) by mouth daily 90 tablet 3    VITAMIN D, CHOLECALCIFEROL, PO Take 2,000 Units by mouth daily D3      azelastine (ASTELIN) 0.1 % nasal spray Spray 1 spray into both nostrils 2 times daily (Patient not taking: Reported on 5/7/2025) 30 mL 11    melatonin 5 MG tablet Take 5 mg by mouth nightly as needed for sleep               Review of Systems  Constitutional, HEENT, cardiovascular, pulmonary, gi and gu systems are negative, except as otherwise noted.      Objective    BP (!) 142/86 (BP Location: Right arm, Patient Position: Standing, Cuff Size: Adult Regular)   Pulse 75   Temp 97.1  F (36.2  C) (Tympanic)   Resp 12   Ht 1.6 m (5' 3\")   Wt 62.7 kg (138 lb 3.2 oz)   SpO2 97%   BMI 24.48 kg/m    Body mass index is 24.48 kg/m .  Physical Exam   GENERAL: alert and no distress  Comprehensive back pain exam:  No tenderness, Pain limits the following motions: flexion, extension, Lower extremity strength functional and equal on both sides, and Straight leg raise negative bilaterally            Signed Electronically by: Vicky Mclean DO    "

## 2025-06-24 NOTE — PATIENT INSTRUCTIONS
Back and Hip symptoms  Continue with Chiropractor, massage  Recommend gentle techniques due to osteoporosis    Recommend physical therapy for the scoliosis, low back, left hip area  Referral to Spine Doctor

## 2025-06-25 ENCOUNTER — TRANSFERRED RECORDS (OUTPATIENT)
Dept: HEALTH INFORMATION MANAGEMENT | Facility: CLINIC | Age: 80
End: 2025-06-25
Payer: COMMERCIAL

## 2025-07-01 ENCOUNTER — TELEPHONE (OUTPATIENT)
Dept: NEUROSURGERY | Facility: CLINIC | Age: 80
End: 2025-07-01
Payer: COMMERCIAL

## 2025-07-01 NOTE — TELEPHONE ENCOUNTER
Date: July 1, 2025    Provider: Other     Provider/Other: Any Med Spine Provider    Reason for out-going call: NEW PATIENT REFERRAL/CONSULT      Detailed message: Patient currently scheduled with Dr. Moralez on 7/22/25, per provider review patient should be scheduled with [facility]. Left voicemail to call back and schedule same day with any spine provider or sooner - patient preference.           Number provider for patient:  SPINE CLINIC: [phone]

## 2025-07-08 ENCOUNTER — TELEPHONE (OUTPATIENT)
Dept: FAMILY MEDICINE | Facility: CLINIC | Age: 80
End: 2025-07-08

## 2025-07-08 ENCOUNTER — ALLIED HEALTH/NURSE VISIT (OUTPATIENT)
Dept: FAMILY MEDICINE | Facility: CLINIC | Age: 80
End: 2025-07-08
Payer: COMMERCIAL

## 2025-07-08 VITALS — SYSTOLIC BLOOD PRESSURE: 134 MMHG | DIASTOLIC BLOOD PRESSURE: 76 MMHG | HEART RATE: 76 BPM

## 2025-07-08 DIAGNOSIS — Z01.30 BLOOD PRESSURE CHECK: Primary | ICD-10-CM

## 2025-07-08 PROCEDURE — 3078F DIAST BP <80 MM HG: CPT

## 2025-07-08 PROCEDURE — 99207 PR NO CHARGE NURSE ONLY: CPT

## 2025-07-08 PROCEDURE — 3075F SYST BP GE 130 - 139MM HG: CPT

## 2025-07-08 NOTE — PROGRESS NOTES
Romana Felipe is a 80 year old patient who comes in today for a Blood Pressure check because of ongoing blood pressure monitoring.  A couple of her BP's have been slightly elevated in clinic recently.   Vital Signs as repeated by RN today:  /78; 134/76 on recheck; Pulse 76.  She brings her home Omron arm cuff with results of 153/95, 145/85.  The 2nd number is within parameters for accuracy. We reviewed correct use and positioning with understanding voiced.   Patient is not prescribed any blood pressure medication yet.   Patient is monitoring Blood Pressure at home.  Average readings if yes are generally under and up to 120's/70's.   Current complaints: none.  She denies recent symptoms and is asymptomatic now.  She seems to be pretty active and watches sodium in her diet.   Disposition:  Patient is scheduled for annual with PCP next month, will plan to follow-up then as planned and will check BP at home on occasion, reach out with concerns.     Pippa Long RN  [facility]

## 2025-07-08 NOTE — TELEPHONE ENCOUNTER
Romana Felipe is a 80 year old patient who comes in today for a Blood Pressure check because of ongoing blood pressure monitoring.  A couple of her BP's have been slightly elevated in clinic recently.   Vital Signs as repeated by RN today:  /78; 134/76 on recheck; Pulse 76.  She brings her home Omron arm cuff with results of 153/95, 145/85.  The 2nd number is within parameters for accuracy. We reviewed correct use and positioning with understanding voiced.   Patient is not prescribed any blood pressure medication yet.   Patient is monitoring Blood Pressure at home.  Average readings if yes are generally under and up to 120's/70's.   Current complaints: none.  She denies recent symptoms and is asymptomatic now.  She seems to be pretty active and watches sodium in her diet.   Disposition:  Patient is scheduled for annual with PCP next month, will plan to follow-up then as planned and will check BP at home on occasion, reach out with concerns.     Pippa Long RN  [facility]

## 2025-07-10 ENCOUNTER — RESULTS FOLLOW-UP (OUTPATIENT)
Dept: ENDOCRINOLOGY | Facility: CLINIC | Age: 80
End: 2025-07-10
Payer: COMMERCIAL

## 2025-07-10 DIAGNOSIS — M81.0 AGE-RELATED OSTEOPOROSIS WITHOUT CURRENT PATHOLOGICAL FRACTURE: Primary | ICD-10-CM

## 2025-07-11 NOTE — TELEPHONE ENCOUNTER
Endocrine team, please update patient with the following message:  -Electrolytes, kidney and liver function tests are normal  -Vitamin D level is higher than necessary for bone health: Please reduce vitamin D from 2000 to 1000 IU daily (alternately, she can take 2000 IU every other day)  -Parathyroid hormone is normal  -Blood count is normal  -It looks like 24-hour urine study orders were discontinued by the system: I have replaced new orders and she can plan to complete them as we discussed at visit

## 2025-07-13 PROCEDURE — 82570 ASSAY OF URINE CREATININE: CPT

## 2025-07-13 PROCEDURE — 84300 ASSAY OF URINE SODIUM: CPT

## 2025-07-13 PROCEDURE — 81050 URINALYSIS VOLUME MEASURE: CPT | Mod: 59

## 2025-07-13 PROCEDURE — 82340 ASSAY OF CALCIUM IN URINE: CPT

## 2025-07-13 PROCEDURE — 81050 URINALYSIS VOLUME MEASURE: CPT

## 2025-07-14 ENCOUNTER — LAB (OUTPATIENT)
Dept: LAB | Facility: CLINIC | Age: 80
End: 2025-07-14
Payer: COMMERCIAL

## 2025-07-14 ENCOUNTER — TELEPHONE (OUTPATIENT)
Dept: FAMILY MEDICINE | Facility: CLINIC | Age: 80
End: 2025-07-14
Payer: COMMERCIAL

## 2025-07-14 DIAGNOSIS — M81.0 AGE-RELATED OSTEOPOROSIS WITHOUT CURRENT PATHOLOGICAL FRACTURE: ICD-10-CM

## 2025-07-14 LAB
CALCIUM 24H UR-MRATE: 0.16 G/SPEC (ref 0.1–0.3)
CALCIUM UR-MCNC: 9.8 MG/DL
COLLECT DURATION TIME UR: 24 H
CREAT 24H UR-MRATE: 0.72 G/SPEC (ref 0.72–1.51)
CREAT UR-MCNC: 43.6 MG/DL
SODIUM 24H UR-SRATE: 153 MMOL/SPEC (ref 40–220)
SODIUM UR-SCNC: 93 MMOL/L
SPECIMEN VOL UR: 1650 ML

## 2025-07-14 NOTE — TELEPHONE ENCOUNTER
General Call    Contacts       Contact Date/Time Type Contact Phone/Fax    07/14/2025 10:11 AM CDT Phone (Incoming) Romana Felipe (Self) [phone] (H)          Reason for Call:  get info on back Dr    What are your questions or concerns:  needs to know dr chely shoud see for scoliosis, has general number but no Dr to see. Please call pt to advise.    Date of last appointment with provider: 6/24/25    Could we send this information to you in [facility] or would you prefer to receive a phone call?:   Patient would prefer a phone call     Okay to leave a detailed message?: Yes at Home number on file [phone] (home)

## 2025-07-14 NOTE — TELEPHONE ENCOUNTER
Pt has pending appt with Cecilia Leal on 7/22 but will be on a vacation.  Informed that Dr Mclean is out of clinic this week and next.    Pt will reschedule her appt so that she can take her vacation.    Pt will call back if any further questions.  Pt has no further questions at this time.    Dianne Mueller RN

## 2025-08-05 ENCOUNTER — OFFICE VISIT (OUTPATIENT)
Dept: PHYSICAL MEDICINE AND REHAB | Facility: CLINIC | Age: 80
End: 2025-08-05
Attending: INTERNAL MEDICINE
Payer: COMMERCIAL

## 2025-08-05 VITALS
TEMPERATURE: 97.9 F | HEIGHT: 62 IN | BODY MASS INDEX: 25.67 KG/M2 | SYSTOLIC BLOOD PRESSURE: 177 MMHG | DIASTOLIC BLOOD PRESSURE: 86 MMHG | WEIGHT: 139.5 LBS | HEART RATE: 75 BPM

## 2025-08-05 DIAGNOSIS — M54.50 LUMBAR SPINE PAIN: Primary | ICD-10-CM

## 2025-08-05 DIAGNOSIS — M41.26 OTHER IDIOPATHIC SCOLIOSIS, LUMBAR REGION: ICD-10-CM

## 2025-08-05 PROCEDURE — 1125F AMNT PAIN NOTED PAIN PRSNT: CPT | Performed by: PHYSICIAN ASSISTANT

## 2025-08-05 PROCEDURE — 99204 OFFICE O/P NEW MOD 45 MIN: CPT | Performed by: PHYSICIAN ASSISTANT

## 2025-08-05 ASSESSMENT — PAIN SCALES - GENERAL: PAINLEVEL_OUTOF10: MODERATE PAIN (4)

## 2025-08-14 SDOH — HEALTH STABILITY: PHYSICAL HEALTH: ON AVERAGE, HOW MANY MINUTES DO YOU ENGAGE IN EXERCISE AT THIS LEVEL?: 10 MIN

## 2025-08-14 SDOH — HEALTH STABILITY: PHYSICAL HEALTH: ON AVERAGE, HOW MANY DAYS PER WEEK DO YOU ENGAGE IN MODERATE TO STRENUOUS EXERCISE (LIKE A BRISK WALK)?: 0 DAYS

## 2025-08-14 ASSESSMENT — SOCIAL DETERMINANTS OF HEALTH (SDOH): HOW OFTEN DO YOU GET TOGETHER WITH FRIENDS OR RELATIVES?: THREE TIMES A WEEK

## 2025-08-18 DIAGNOSIS — M81.0 AGE-RELATED OSTEOPOROSIS WITHOUT CURRENT PATHOLOGICAL FRACTURE: Primary | ICD-10-CM

## 2025-08-18 RX ORDER — METHYLPREDNISOLONE SODIUM SUCCINATE 40 MG/ML
40 INJECTION INTRAMUSCULAR; INTRAVENOUS
Start: 2025-08-18

## 2025-08-18 RX ORDER — EPINEPHRINE 1 MG/ML
0.3 INJECTION, SOLUTION, CONCENTRATE INTRAVENOUS EVERY 5 MIN PRN
OUTPATIENT
Start: 2025-08-18

## 2025-08-18 RX ORDER — ALBUTEROL SULFATE 0.83 MG/ML
2.5 SOLUTION RESPIRATORY (INHALATION)
OUTPATIENT
Start: 2025-08-18

## 2025-08-18 RX ORDER — DIPHENHYDRAMINE HYDROCHLORIDE 50 MG/ML
50 INJECTION, SOLUTION INTRAMUSCULAR; INTRAVENOUS
Start: 2025-08-18

## 2025-08-18 RX ORDER — DIPHENHYDRAMINE HYDROCHLORIDE 50 MG/ML
25 INJECTION, SOLUTION INTRAMUSCULAR; INTRAVENOUS
Start: 2025-08-18

## 2025-08-18 RX ORDER — ZOLEDRONIC ACID 0.05 MG/ML
5 INJECTION, SOLUTION INTRAVENOUS ONCE
Start: 2025-08-18

## 2025-08-18 RX ORDER — ALBUTEROL SULFATE 90 UG/1
1-2 INHALANT RESPIRATORY (INHALATION)
Start: 2025-08-18

## 2025-08-18 RX ORDER — HEPARIN SODIUM (PORCINE) LOCK FLUSH IV SOLN 100 UNIT/ML 100 UNIT/ML
5 SOLUTION INTRAVENOUS
OUTPATIENT
Start: 2025-08-18

## 2025-08-18 RX ORDER — HEPARIN SODIUM,PORCINE 10 UNIT/ML
5-20 VIAL (ML) INTRAVENOUS DAILY PRN
OUTPATIENT
Start: 2025-08-18

## 2025-08-19 ENCOUNTER — OFFICE VISIT (OUTPATIENT)
Dept: FAMILY MEDICINE | Facility: CLINIC | Age: 80
End: 2025-08-19
Payer: COMMERCIAL

## 2025-08-19 VITALS
SYSTOLIC BLOOD PRESSURE: 135 MMHG | BODY MASS INDEX: 24.31 KG/M2 | WEIGHT: 137.2 LBS | RESPIRATION RATE: 20 BRPM | OXYGEN SATURATION: 98 % | HEIGHT: 63 IN | HEART RATE: 72 BPM | TEMPERATURE: 97.8 F | DIASTOLIC BLOOD PRESSURE: 85 MMHG

## 2025-08-19 DIAGNOSIS — Z00.00 ENCOUNTER FOR MEDICARE ANNUAL WELLNESS EXAM: Primary | ICD-10-CM

## 2025-08-19 DIAGNOSIS — M81.0 AGE-RELATED OSTEOPOROSIS WITHOUT CURRENT PATHOLOGICAL FRACTURE: ICD-10-CM

## 2025-08-19 DIAGNOSIS — E78.5 HYPERLIPIDEMIA LDL GOAL <100: ICD-10-CM

## 2025-08-19 LAB
CHOLEST SERPL-MCNC: 167 MG/DL
FASTING STATUS PATIENT QL REPORTED: YES
HDLC SERPL-MCNC: 70 MG/DL
LDLC SERPL CALC-MCNC: 74 MG/DL
NONHDLC SERPL-MCNC: 97 MG/DL
TRIGL SERPL-MCNC: 117 MG/DL

## 2025-08-19 PROCEDURE — 80061 LIPID PANEL: CPT | Performed by: INTERNAL MEDICINE

## 2025-08-19 PROCEDURE — 36415 COLL VENOUS BLD VENIPUNCTURE: CPT | Performed by: INTERNAL MEDICINE

## 2025-08-19 RX ORDER — ROSUVASTATIN CALCIUM 20 MG/1
20 TABLET, COATED ORAL DAILY
Qty: 90 TABLET | Refills: 3 | Status: SHIPPED | OUTPATIENT
Start: 2025-08-19

## 2025-08-19 ASSESSMENT — PAIN SCALES - GENERAL: PAINLEVEL_OUTOF10: MILD PAIN (3)

## 2025-08-26 ENCOUNTER — OFFICE VISIT (OUTPATIENT)
Dept: DERMATOLOGY | Facility: CLINIC | Age: 80
End: 2025-08-26
Payer: COMMERCIAL

## 2025-08-26 DIAGNOSIS — D23.9 DERMAL NEVUS: Primary | ICD-10-CM

## 2025-08-26 DIAGNOSIS — L82.1 SEBORRHEIC KERATOSES: ICD-10-CM

## 2025-08-26 DIAGNOSIS — D18.01 ANGIOMA OF SKIN: ICD-10-CM

## 2025-08-26 DIAGNOSIS — Z85.828 HISTORY OF SKIN CANCER: ICD-10-CM

## 2025-08-26 DIAGNOSIS — L81.4 LENTIGO: ICD-10-CM

## 2025-08-26 DIAGNOSIS — D22.9 MULTIPLE BENIGN NEVI: ICD-10-CM

## 2025-08-26 DIAGNOSIS — L82.0 INFLAMED SEBORRHEIC KERATOSIS: ICD-10-CM

## 2025-08-26 PROCEDURE — 99213 OFFICE O/P EST LOW 20 MIN: CPT | Mod: 25 | Performed by: DERMATOLOGY

## 2025-08-26 PROCEDURE — 17110 DESTRUCTION B9 LES UP TO 14: CPT | Performed by: DERMATOLOGY
